# Patient Record
Sex: MALE | Race: WHITE | NOT HISPANIC OR LATINO | Employment: FULL TIME | URBAN - METROPOLITAN AREA
[De-identification: names, ages, dates, MRNs, and addresses within clinical notes are randomized per-mention and may not be internally consistent; named-entity substitution may affect disease eponyms.]

---

## 2017-01-10 ENCOUNTER — AMBULATORY - HEALTHEAST (OUTPATIENT)
Dept: INTERNAL MEDICINE | Facility: CLINIC | Age: 52
End: 2017-01-10

## 2017-01-10 DIAGNOSIS — I25.10 CAD IN NATIVE ARTERY: ICD-10-CM

## 2017-01-17 ENCOUNTER — RECORDS - HEALTHEAST (OUTPATIENT)
Dept: ADMINISTRATIVE | Facility: OTHER | Age: 52
End: 2017-01-17

## 2017-02-07 ENCOUNTER — RECORDS - HEALTHEAST (OUTPATIENT)
Dept: ADMINISTRATIVE | Facility: OTHER | Age: 52
End: 2017-02-07

## 2017-10-14 ENCOUNTER — COMMUNICATION - HEALTHEAST (OUTPATIENT)
Dept: INTERNAL MEDICINE | Facility: CLINIC | Age: 52
End: 2017-10-14

## 2017-10-14 DIAGNOSIS — M10.9 GOUT: ICD-10-CM

## 2017-11-29 ENCOUNTER — AMBULATORY - HEALTHEAST (OUTPATIENT)
Dept: INTERNAL MEDICINE | Facility: CLINIC | Age: 52
End: 2017-11-29

## 2017-12-21 ENCOUNTER — COMMUNICATION - HEALTHEAST (OUTPATIENT)
Dept: INTERNAL MEDICINE | Facility: CLINIC | Age: 52
End: 2017-12-21

## 2017-12-22 ENCOUNTER — OFFICE VISIT - HEALTHEAST (OUTPATIENT)
Dept: INTERNAL MEDICINE | Facility: CLINIC | Age: 52
End: 2017-12-22

## 2017-12-22 DIAGNOSIS — I25.10 CORONARY ARTERY DISEASE INVOLVING NATIVE HEART WITHOUT ANGINA PECTORIS, UNSPECIFIED VESSEL OR LESION TYPE: ICD-10-CM

## 2017-12-22 DIAGNOSIS — K80.00 CALCULUS OF GALLBLADDER WITH ACUTE CHOLECYSTITIS WITHOUT OBSTRUCTION: ICD-10-CM

## 2017-12-22 DIAGNOSIS — Z00.00 ROUTINE GENERAL MEDICAL EXAMINATION AT A HEALTH CARE FACILITY: ICD-10-CM

## 2017-12-22 DIAGNOSIS — M10.9 GOUT: ICD-10-CM

## 2017-12-22 DIAGNOSIS — Z82.49 FAMILY HISTORY OF CORONARY ARTERIOSCLEROSIS: ICD-10-CM

## 2017-12-22 DIAGNOSIS — E78.00 PURE HYPERCHOLESTEROLEMIA: ICD-10-CM

## 2017-12-22 LAB
CHOLEST SERPL-MCNC: 148 MG/DL
FASTING STATUS PATIENT QL REPORTED: YES
HDLC SERPL-MCNC: 58 MG/DL
LDLC SERPL CALC-MCNC: 47 MG/DL
PSA SERPL-MCNC: 1 NG/ML (ref 0–3.5)
TRIGL SERPL-MCNC: 216 MG/DL

## 2017-12-22 ASSESSMENT — MIFFLIN-ST. JEOR: SCORE: 2001.99

## 2017-12-24 ENCOUNTER — COMMUNICATION - HEALTHEAST (OUTPATIENT)
Dept: INTERNAL MEDICINE | Facility: CLINIC | Age: 52
End: 2017-12-24

## 2018-01-16 ENCOUNTER — COMMUNICATION - HEALTHEAST (OUTPATIENT)
Dept: INTERNAL MEDICINE | Facility: CLINIC | Age: 53
End: 2018-01-16

## 2018-01-16 DIAGNOSIS — M10.9 GOUT: ICD-10-CM

## 2018-01-17 ENCOUNTER — COMMUNICATION - HEALTHEAST (OUTPATIENT)
Dept: INTERNAL MEDICINE | Facility: CLINIC | Age: 53
End: 2018-01-17

## 2018-04-22 ENCOUNTER — COMMUNICATION - HEALTHEAST (OUTPATIENT)
Dept: INTERNAL MEDICINE | Facility: CLINIC | Age: 53
End: 2018-04-22

## 2018-04-22 DIAGNOSIS — E78.00 PURE HYPERCHOLESTEROLEMIA: ICD-10-CM

## 2018-12-30 ENCOUNTER — COMMUNICATION - HEALTHEAST (OUTPATIENT)
Dept: INTERNAL MEDICINE | Facility: CLINIC | Age: 53
End: 2018-12-30

## 2018-12-30 DIAGNOSIS — E78.00 PURE HYPERCHOLESTEROLEMIA: ICD-10-CM

## 2018-12-30 DIAGNOSIS — M10.9 GOUT: ICD-10-CM

## 2019-08-27 ENCOUNTER — AMBULATORY - HEALTHEAST (OUTPATIENT)
Dept: INTERNAL MEDICINE | Facility: CLINIC | Age: 54
End: 2019-08-27

## 2019-08-27 DIAGNOSIS — M54.40 ACUTE RIGHT-SIDED LOW BACK PAIN WITH SCIATICA, SCIATICA LATERALITY UNSPECIFIED: ICD-10-CM

## 2019-10-29 ENCOUNTER — RECORDS - HEALTHEAST (OUTPATIENT)
Dept: GENERAL RADIOLOGY | Facility: CLINIC | Age: 54
End: 2019-10-29

## 2019-10-29 ENCOUNTER — OFFICE VISIT - HEALTHEAST (OUTPATIENT)
Dept: INTERNAL MEDICINE | Facility: CLINIC | Age: 54
End: 2019-10-29

## 2019-10-29 DIAGNOSIS — Z00.00 ROUTINE GENERAL MEDICAL EXAMINATION AT A HEALTH CARE FACILITY: ICD-10-CM

## 2019-10-29 DIAGNOSIS — E78.00 PURE HYPERCHOLESTEROLEMIA: ICD-10-CM

## 2019-10-29 DIAGNOSIS — E66.9 OBESITY, UNSPECIFIED CLASSIFICATION, UNSPECIFIED OBESITY TYPE, UNSPECIFIED WHETHER SERIOUS COMORBIDITY PRESENT: ICD-10-CM

## 2019-10-29 DIAGNOSIS — R74.02 NONSPECIFIC ELEVATION OF LEVELS OF TRANSAMINASE OR LACTIC ACID DEHYDROGENASE (LDH): ICD-10-CM

## 2019-10-29 DIAGNOSIS — M51.16 LUMBAR DISC DISEASE WITH RADICULOPATHY: ICD-10-CM

## 2019-10-29 DIAGNOSIS — G47.33 OSA ON CPAP: ICD-10-CM

## 2019-10-29 DIAGNOSIS — I25.10 CORONARY ARTERY DISEASE INVOLVING NATIVE HEART WITHOUT ANGINA PECTORIS, UNSPECIFIED VESSEL OR LESION TYPE: ICD-10-CM

## 2019-10-29 DIAGNOSIS — M79.644 PAIN IN FINGER OF BOTH HANDS: ICD-10-CM

## 2019-10-29 DIAGNOSIS — M79.645 PAIN IN FINGER OF BOTH HANDS: ICD-10-CM

## 2019-10-29 DIAGNOSIS — Z12.5 SCREENING FOR PROSTATE CANCER: ICD-10-CM

## 2019-10-29 DIAGNOSIS — M10.9 GOUT: ICD-10-CM

## 2019-10-29 DIAGNOSIS — Z23 IMMUNIZATION DUE: ICD-10-CM

## 2019-10-29 DIAGNOSIS — M79.645 PAIN IN LEFT FINGER(S): ICD-10-CM

## 2019-10-29 DIAGNOSIS — Z11.59 NEED FOR HEPATITIS C SCREENING TEST: ICD-10-CM

## 2019-10-29 DIAGNOSIS — N52.9 ERECTILE DYSFUNCTION, UNSPECIFIED ERECTILE DYSFUNCTION TYPE: ICD-10-CM

## 2019-10-29 DIAGNOSIS — R74.01 NONSPECIFIC ELEVATION OF LEVELS OF TRANSAMINASE OR LACTIC ACID DEHYDROGENASE (LDH): ICD-10-CM

## 2019-10-29 DIAGNOSIS — R53.83 FATIGUE, UNSPECIFIED TYPE: ICD-10-CM

## 2019-10-29 DIAGNOSIS — Z23 NEED FOR PROPHYLACTIC VACCINATION AND INOCULATION AGAINST INFLUENZA: ICD-10-CM

## 2019-10-29 DIAGNOSIS — M79.644 PAIN IN RIGHT FINGER(S): ICD-10-CM

## 2019-10-29 DIAGNOSIS — Z13.1 SCREENING FOR DIABETES MELLITUS: ICD-10-CM

## 2019-10-29 LAB
ALBUMIN SERPL-MCNC: 4.7 G/DL (ref 3.5–5)
ALBUMIN UR-MCNC: ABNORMAL MG/DL
ALP SERPL-CCNC: 65 U/L (ref 45–120)
ALT SERPL W P-5'-P-CCNC: 62 U/L (ref 0–45)
ANION GAP SERPL CALCULATED.3IONS-SCNC: 11 MMOL/L (ref 5–18)
APPEARANCE UR: CLEAR
AST SERPL W P-5'-P-CCNC: 45 U/L (ref 0–40)
BACTERIA #/AREA URNS HPF: ABNORMAL HPF
BILIRUB SERPL-MCNC: 0.8 MG/DL (ref 0–1)
BILIRUB UR QL STRIP: NEGATIVE
BUN SERPL-MCNC: 16 MG/DL (ref 8–22)
C REACTIVE PROTEIN LHE: 0.2 MG/DL (ref 0–0.8)
CALCIUM SERPL-MCNC: 10 MG/DL (ref 8.5–10.5)
CCP AB SER IA-ACNC: 1.9 U/ML
CHLORIDE BLD-SCNC: 104 MMOL/L (ref 98–107)
CHOLEST SERPL-MCNC: 156 MG/DL
CO2 SERPL-SCNC: 25 MMOL/L (ref 22–31)
COLOR UR AUTO: YELLOW
CREAT SERPL-MCNC: 0.87 MG/DL (ref 0.7–1.3)
ERYTHROCYTE [DISTWIDTH] IN BLOOD BY AUTOMATED COUNT: 11.5 % (ref 11–14.5)
ERYTHROCYTE [SEDIMENTATION RATE] IN BLOOD BY WESTERGREN METHOD: 4 MM/HR (ref 0–15)
FASTING STATUS PATIENT QL REPORTED: YES
GFR SERPL CREATININE-BSD FRML MDRD: >60 ML/MIN/1.73M2
GLUCOSE BLD-MCNC: 120 MG/DL (ref 70–125)
GLUCOSE UR STRIP-MCNC: NEGATIVE MG/DL
HBA1C MFR BLD: 6.1 % (ref 3.5–6)
HCT VFR BLD AUTO: 46.4 % (ref 40–54)
HDLC SERPL-MCNC: 47 MG/DL
HGB BLD-MCNC: 15.8 G/DL (ref 14–18)
HGB UR QL STRIP: NEGATIVE
HYALINE CASTS #/AREA URNS LPF: ABNORMAL LPF
KETONES UR STRIP-MCNC: NEGATIVE MG/DL
LDLC SERPL CALC-MCNC: 56 MG/DL
LEUKOCYTE ESTERASE UR QL STRIP: NEGATIVE
MCH RBC QN AUTO: 30.3 PG (ref 27–34)
MCHC RBC AUTO-ENTMCNC: 34.1 G/DL (ref 32–36)
MCV RBC AUTO: 89 FL (ref 80–100)
MUCOUS THREADS #/AREA URNS LPF: ABNORMAL LPF
NITRATE UR QL: NEGATIVE
PH UR STRIP: 5.5 [PH] (ref 4.5–8)
PLATELET # BLD AUTO: 202 THOU/UL (ref 140–440)
PMV BLD AUTO: 8.6 FL (ref 7–10)
POTASSIUM BLD-SCNC: 4.4 MMOL/L (ref 3.5–5)
PROT SERPL-MCNC: 7.5 G/DL (ref 6–8)
PSA SERPL-MCNC: 1 NG/ML (ref 0–3.5)
RBC # BLD AUTO: 5.23 MILL/UL (ref 4.4–6.2)
RBC #/AREA URNS AUTO: ABNORMAL HPF
RHEUMATOID FACT SERPL-ACNC: 16.9 IU/ML (ref 0–30)
SODIUM SERPL-SCNC: 140 MMOL/L (ref 136–145)
SP GR UR STRIP: 1.02 (ref 1–1.03)
SQUAMOUS #/AREA URNS AUTO: ABNORMAL LPF
TRIGL SERPL-MCNC: 267 MG/DL
TSH SERPL DL<=0.005 MIU/L-ACNC: 2.04 UIU/ML (ref 0.3–5)
UROBILINOGEN UR STRIP-ACNC: ABNORMAL
WBC #/AREA URNS AUTO: ABNORMAL HPF
WBC: 5.6 THOU/UL (ref 4–11)

## 2019-10-29 RX ORDER — QUINIDINE SULFATE 200 MG
50 TABLET ORAL 2 TIMES DAILY
Status: SHIPPED | COMMUNITY
Start: 2019-10-29 | End: 2023-12-27

## 2019-10-29 RX ORDER — CHLORAL HYDRATE 500 MG
2 CAPSULE ORAL EVERY MORNING
Status: SHIPPED | COMMUNITY
Start: 2019-10-29

## 2019-10-29 ASSESSMENT — MIFFLIN-ST. JEOR: SCORE: 2029.2

## 2019-10-30 LAB — HCV AB SERPL QL IA: NEGATIVE

## 2019-10-31 LAB — ANA SER QL: 0.4 U

## 2020-02-13 ENCOUNTER — AMBULATORY - HEALTHEAST (OUTPATIENT)
Dept: NURSING | Facility: CLINIC | Age: 55
End: 2020-02-13

## 2020-02-13 DIAGNOSIS — Z23 NEED FOR ZOSTER VACCINATION: ICD-10-CM

## 2020-05-13 ENCOUNTER — RECORDS - HEALTHEAST (OUTPATIENT)
Dept: ADMINISTRATIVE | Facility: OTHER | Age: 55
End: 2020-05-13

## 2020-06-19 ENCOUNTER — RECORDS - HEALTHEAST (OUTPATIENT)
Dept: ADMINISTRATIVE | Facility: OTHER | Age: 55
End: 2020-06-19

## 2020-06-24 ENCOUNTER — RECORDS - HEALTHEAST (OUTPATIENT)
Dept: ADMINISTRATIVE | Facility: OTHER | Age: 55
End: 2020-06-24

## 2020-11-17 ENCOUNTER — COMMUNICATION - HEALTHEAST (OUTPATIENT)
Dept: INTERNAL MEDICINE | Facility: CLINIC | Age: 55
End: 2020-11-17

## 2020-11-17 DIAGNOSIS — M10.9 GOUT: ICD-10-CM

## 2020-11-17 DIAGNOSIS — E78.00 PURE HYPERCHOLESTEROLEMIA: ICD-10-CM

## 2020-11-18 RX ORDER — ALLOPURINOL 100 MG/1
100 TABLET ORAL DAILY
Qty: 90 TABLET | Refills: 3 | Status: SHIPPED | OUTPATIENT
Start: 2020-11-18 | End: 2021-12-17

## 2020-11-18 RX ORDER — ROSUVASTATIN CALCIUM 20 MG/1
20 TABLET, COATED ORAL DAILY
Qty: 90 TABLET | Refills: 3 | Status: SHIPPED | OUTPATIENT
Start: 2020-11-18 | End: 2021-12-17

## 2021-04-05 ENCOUNTER — RECORDS - HEALTHEAST (OUTPATIENT)
Dept: GENERAL RADIOLOGY | Facility: CLINIC | Age: 56
End: 2021-04-05

## 2021-04-05 ENCOUNTER — OFFICE VISIT - HEALTHEAST (OUTPATIENT)
Dept: INTERNAL MEDICINE | Facility: CLINIC | Age: 56
End: 2021-04-05

## 2021-04-05 DIAGNOSIS — R05.2 SUBACUTE COUGH: ICD-10-CM

## 2021-04-05 DIAGNOSIS — M54.2 CERVICALGIA: ICD-10-CM

## 2021-04-05 DIAGNOSIS — Z12.5 SCREENING FOR PROSTATE CANCER: ICD-10-CM

## 2021-04-05 DIAGNOSIS — Z13.1 SCREENING FOR DIABETES MELLITUS: ICD-10-CM

## 2021-04-05 DIAGNOSIS — M10.9 GOUT, UNSPECIFIED CAUSE, UNSPECIFIED CHRONICITY, UNSPECIFIED SITE: ICD-10-CM

## 2021-04-05 DIAGNOSIS — N52.9 ERECTILE DYSFUNCTION, UNSPECIFIED ERECTILE DYSFUNCTION TYPE: ICD-10-CM

## 2021-04-05 DIAGNOSIS — Z00.00 ROUTINE GENERAL MEDICAL EXAMINATION AT A HEALTH CARE FACILITY: ICD-10-CM

## 2021-04-05 DIAGNOSIS — M54.2 NECK PAIN: ICD-10-CM

## 2021-04-05 DIAGNOSIS — R74.01 NONSPECIFIC ELEVATION OF LEVELS OF TRANSAMINASE OR LACTIC ACID DEHYDROGENASE (LDH): ICD-10-CM

## 2021-04-05 DIAGNOSIS — E78.00 PURE HYPERCHOLESTEROLEMIA: ICD-10-CM

## 2021-04-05 DIAGNOSIS — R74.02 NONSPECIFIC ELEVATION OF LEVELS OF TRANSAMINASE OR LACTIC ACID DEHYDROGENASE (LDH): ICD-10-CM

## 2021-04-05 DIAGNOSIS — G47.33 OSA ON CPAP: ICD-10-CM

## 2021-04-05 DIAGNOSIS — I25.10 CORONARY ARTERY DISEASE INVOLVING NATIVE HEART WITHOUT ANGINA PECTORIS, UNSPECIFIED VESSEL OR LESION TYPE: ICD-10-CM

## 2021-04-05 LAB
ALBUMIN SERPL-MCNC: 4.3 G/DL (ref 3.5–5)
ALP SERPL-CCNC: 63 U/L (ref 45–120)
ALT SERPL W P-5'-P-CCNC: 46 U/L (ref 0–45)
ANION GAP SERPL CALCULATED.3IONS-SCNC: 10 MMOL/L (ref 5–18)
AST SERPL W P-5'-P-CCNC: 34 U/L (ref 0–40)
BILIRUB SERPL-MCNC: 0.7 MG/DL (ref 0–1)
BUN SERPL-MCNC: 15 MG/DL (ref 8–22)
CALCIUM SERPL-MCNC: 9.3 MG/DL (ref 8.5–10.5)
CHLORIDE BLD-SCNC: 105 MMOL/L (ref 98–107)
CHOLEST SERPL-MCNC: 139 MG/DL
CO2 SERPL-SCNC: 24 MMOL/L (ref 22–31)
CREAT SERPL-MCNC: 0.78 MG/DL (ref 0.7–1.3)
ERYTHROCYTE [DISTWIDTH] IN BLOOD BY AUTOMATED COUNT: 12 % (ref 11–14.5)
FASTING STATUS PATIENT QL REPORTED: YES
GFR SERPL CREATININE-BSD FRML MDRD: >60 ML/MIN/1.73M2
GLUCOSE BLD-MCNC: 111 MG/DL (ref 70–125)
HBA1C MFR BLD: 5.8 %
HCT VFR BLD AUTO: 45.8 % (ref 40–54)
HDLC SERPL-MCNC: 47 MG/DL
HGB BLD-MCNC: 15.2 G/DL (ref 14–18)
LDLC SERPL CALC-MCNC: 48 MG/DL
MCH RBC QN AUTO: 29.6 PG (ref 27–34)
MCHC RBC AUTO-ENTMCNC: 33.2 G/DL (ref 32–36)
MCV RBC AUTO: 89 FL (ref 80–100)
PLATELET # BLD AUTO: 195 THOU/UL (ref 140–440)
PMV BLD AUTO: 9.5 FL (ref 7–10)
POTASSIUM BLD-SCNC: 4 MMOL/L (ref 3.5–5)
PROT SERPL-MCNC: 7.3 G/DL (ref 6–8)
PSA SERPL-MCNC: 1 NG/ML (ref 0–3.5)
RBC # BLD AUTO: 5.13 MILL/UL (ref 4.4–6.2)
SODIUM SERPL-SCNC: 139 MMOL/L (ref 136–145)
TRIGL SERPL-MCNC: 221 MG/DL
TSH SERPL DL<=0.005 MIU/L-ACNC: 1.88 UIU/ML (ref 0.3–5)
WBC: 4.4 THOU/UL (ref 4–11)

## 2021-04-05 ASSESSMENT — MIFFLIN-ST. JEOR: SCORE: 2038.56

## 2021-04-07 ENCOUNTER — IMMUNIZATION (OUTPATIENT)
Dept: NURSING | Facility: CLINIC | Age: 56
End: 2021-04-07
Payer: COMMERCIAL

## 2021-04-07 LAB — TESTOST SERPL-MCNC: 392 NG/DL (ref 221–716)

## 2021-04-07 PROCEDURE — 0001A PR COVID VAC PFIZER DIL RECON 30 MCG/0.3 ML IM: CPT

## 2021-04-07 PROCEDURE — 91300 PR COVID VAC PFIZER DIL RECON 30 MCG/0.3 ML IM: CPT

## 2021-04-28 ENCOUNTER — IMMUNIZATION (OUTPATIENT)
Dept: NURSING | Facility: CLINIC | Age: 56
End: 2021-04-28
Attending: INTERNAL MEDICINE
Payer: COMMERCIAL

## 2021-04-28 PROCEDURE — 91300 PR COVID VAC PFIZER DIL RECON 30 MCG/0.3 ML IM: CPT

## 2021-04-28 PROCEDURE — 0002A PR COVID VAC PFIZER DIL RECON 30 MCG/0.3 ML IM: CPT

## 2021-05-31 VITALS — WEIGHT: 236 LBS | HEIGHT: 76 IN | BODY MASS INDEX: 28.74 KG/M2

## 2021-06-02 NOTE — PROGRESS NOTES
Office Visit - Physical   Jared Rae   54 y.o.  male    Date of visit: 10/29/2019  Physician: Jacoby Jaquez MD     Assessment and Plan   1. Routine general medical examination at a health care facility  This is a generally healthy 54-year-old man with issues as discussed below.  Ongoing healthy lifestyle discussed and recommended.    2. Need for prophylactic vaccination and inoculation against influenza  - Influenza, Recombinant, Inj, Quadrivalent, PF, 18+YRS    3. Screening for prostate cancer  - PSA (Prostatic-Specific Antigen), Annual Screen    4. Screening for diabetes mellitus  - Glycosylated Hemoglobin A1c    5. Need for hepatitis C screening test  - Hepatitis C Antibody (Anti-HCV)    6. Pure hypercholesterolemia  - rosuvastatin (CRESTOR) 20 MG tablet; Take 1 tablet (20 mg total) by mouth daily.  Dispense: 90 tablet; Refill: 3  - Lipid Cascade    7. Gout  - allopurinol (ZYLOPRIM) 100 MG tablet; Take 1 tablet (100 mg total) by mouth daily.  Dispense: 90 tablet; Refill: 3    8. Coronary artery disease involving native heart without angina pectoris, unspecified vessel or lesion type  Continue primary prevention, no history of coronary event or intervention  - HM2(CBC w/o Differential)  - Comprehensive Metabolic Panel  - Urinalysis-UC if Indicated    9. SAM on CPAP    10. Lumbar disc disease with radiculopathy  Continue with home exercise, discussed importance of weight loss.    11. Erectile dysfunction, unspecified erectile dysfunction type  Has not been an issue recently    12. Serum Enzyme Levels - ALT (SGPT) Elevated  Likely related to alcohol and her weight, recheck if still elevated will discuss evaluation of other causes as well  - Comprehensive Metabolic Panel    13. Pain in finger of both hands  Likely osteoarthritic, no synovial thickening or inflammation on exam given sister's history we will check labs and imaging studies below  - Antinuclear Antibody (OG) Cascade  - Rheumatoid Factor  Quant  - CCP Antibodies  - XR Hands Bilateral PA VW; Future  - Erythrocyte Sedimentation Rate  - C-Reactive Protein    14. Fatigue, unspecified type  - Thyroid Cascade    15. Immunization due  - Varicella Zoster, Recombinant Vaccine IM    16. Obesity, unspecified classification, unspecified obesity type, unspecified whether serious comorbidity present  The following high BMI interventions were performed this visit: encouragement to exercise and lifestyle education regarding diet    Return in about 6 months (around 4/29/2020) for recheck.     Chief Complaint   Chief Complaint   Patient presents with     Annual Exam     fasting     Neck Pain     headache     Fatigue     Arthritis     hands        Patient Profile   Social History     Patient does not qualify to have social determinant information on file (likely too young).   Social History Narrative    , wife Lakeshia.  Three daughters, Lauryn, Teresa, Audrey.  Owns [employer].          Past Medical History   Patient Active Problem List   Diagnosis     Pure hypercholesterolemia     Serum Enzyme Levels - ALT (SGPT) Elevated     Gout     ED (erectile dysfunction)     Lumbar disc disease with radiculopathy     SAM on CPAP     Coronary artery disease involving native heart without angina pectoris, unspecified vessel or lesion type       Past Surgical History  He has a past surgical history that includes Laparoscopic cholecystectomy (N/A, 11/1/2016); Anterior cruciate ligament repair (Bilateral, he was 24 yrs and 46 years old); and Inguinal hernia repair.     History of Present Illness   This 54 y.o. old man comes in to establish care and for annual physical and evaluation of acute medical issues.  His medical history is reviewed, electronic medical records update is reflectance no.  His sister was recently diagnosed with lupus.  He has some pain in his third and fourth fingers which wakes him up at night.  He has stiffness that lasts for multiple  hours during the day.  Never red hot or swollen.  He has no other joint issues.  Some knee pain neck and low back pain which are more chronic and related to injuries.  No known kidney disease.  No history of pleuritic pain or pleurisy.  No history of inflammatory eye conditions.  He does have gout on allopurinol and doing well.  He has a history of coronary artery disease without history of myocardial infarction or intervention.  He is on rosuvastatin aspirin tolerating.  He is gained some weight he would like to lose some weight.  He has some back pain recently had an injection which was helpful.  Is also been to physician neck and back in the past.  Having some neck pain which caused some posterior headache.    Review of Systems: A comprehensive review of systems was negative except as noted.     Medications and Allergies   Current Outpatient Medications   Medication Sig Dispense Refill     allopurinol (ZYLOPRIM) 100 MG tablet Take 1 tablet (100 mg total) by mouth daily. 90 tablet 3     aspirin 81 MG EC tablet Take 1 tablet (81 mg total) by mouth daily. 90 tablet 3     co-enzyme Q-10 30 mg capsule Take 30 mg by mouth 3 (three) times a day.       multivit-min/ferrous fumarate (MULTI VITAMIN ORAL) Take by mouth.       mv-mn/iron/folic acid/herb 190 (VITAMIN D3 COMPLETE ORAL) Take 50 mcg by mouth 2 (two) times a day.       omega-3/dha/epa/fish oil (FISH OIL-OMEGA-3 FATTY ACIDS) 300-1,000 mg capsule Take 2 g by mouth daily.       rosuvastatin (CRESTOR) 20 MG tablet Take 1 tablet (20 mg total) by mouth daily. 90 tablet 3     No current facility-administered medications for this visit.      No Known Allergies     Family and Social History   Family History   Problem Relation Age of Onset     Heart attack Father      Coronary artery disease Brother      No Medical Problems Mother      Congenital heart disease Sister      Lupus Sister      Coronary artery disease Brother      Coronary artery disease Brother      CABG  "Brother      No Medical Problems Brother      No Medical Problems Brother      No Medical Problems Daughter      No Medical Problems Daughter      No Medical Problems Daughter         Social History     Tobacco Use     Smoking status: Never Smoker     Smokeless tobacco: Never Used   Substance Use Topics     Alcohol use: Yes     Alcohol/week: 10.0 standard drinks     Types: 10 Standard drinks or equivalent per week     Drug use: No        Physical Exam   General Appearance:   No acute distress    /84 (Patient Site: Right Arm, Patient Position: Sitting, Cuff Size: Adult Regular)   Pulse 68   Ht 6' 4\" (1.93 m)   Wt (!) 242 lb (109.8 kg)   SpO2 98%   BMI 29.46 kg/m      EYES: Eyelids, conjunctiva, and sclera were normal. Pupils were normal. Cornea, iris, and lens were normal bilaterally.  HEAD, EARS, NOSE, MOUTH, AND THROAT: Head and face were normal. Hearing was normal to voice and the ears were normal to external exam. Nose appearance was normal and there was no discharge. Oropharynx was normal.  NECK: Neck appearance was normal. There were no neck masses and the thyroid was not enlarged.  RESPIRATORY: Breathing pattern was normal and the chest moved symmetrically.  Percussion/auscultatory percussion was normal.  Lung sounds were normal and there were no abnormal sounds.  CARDIOVASCULAR: Heart rate and rhythm were normal.  S1 and S2 were normal and there were no extra sounds or murmurs. Peripheral pulses in arms and legs were normal.  Jugular venous pressure was normal.  There was no peripheral edema.  GASTROINTESTINAL: The abdomen was normal in contour.  Bowel sounds were present.  Percussion detected no organ enlargement or tenderness.  Palpation detected no tenderness, mass, or enlarged organs.   MUSCULOSKELETAL: Skeletal configuration was normal and muscle mass was normal for age. Joint appearance was overall normal.  LYMPHATIC: There were no enlarged nodes.  SKIN/HAIR/NAILS: Skin color was normal.  " There were no skin lesions.  Hair and nails were normal.  NEUROLOGIC: The patient was alert and oriented to person, place, time, and circumstance. Speech was normal. Cranial nerves were normal. Motor strength was normal for age. The patient was normally coordinated.  PSYCHIATRIC:  Mood and affect were normal and the patient had normal recent and remote memory. The patient's judgment and insight were normal.       Additional Information        Jacoby Jaquez MD  Internal Medicine  Contact me at [phone]

## 2021-06-03 VITALS
DIASTOLIC BLOOD PRESSURE: 84 MMHG | HEIGHT: 76 IN | WEIGHT: 242 LBS | OXYGEN SATURATION: 98 % | SYSTOLIC BLOOD PRESSURE: 136 MMHG | HEART RATE: 68 BPM | BODY MASS INDEX: 29.47 KG/M2

## 2021-06-05 VITALS
SYSTOLIC BLOOD PRESSURE: 134 MMHG | DIASTOLIC BLOOD PRESSURE: 84 MMHG | HEIGHT: 76 IN | BODY MASS INDEX: 29.72 KG/M2 | HEART RATE: 60 BPM | WEIGHT: 244.06 LBS | OXYGEN SATURATION: 96 %

## 2021-06-08 NOTE — PROGRESS NOTES
I did review Camden's CTA with him.  There was evidence of diffuse mildly obstructive coronary disease with possible significant stenosis at the second diagonal with luminal stenosis of up to 70%.  There was significant calcification with mild-to-moderate nonocclusive calcific plaque at the first diagonal and diffuse proximal calcific plaque in the proximal LAD which was non-obstructive    Given these findings it is my impression the knee has asymptomatic coronary disease that does not currently require intervention.  I would recommend aggressive management with antiplatelet therapy i.e. aspirin 81 mg daily and statin therapy with a target LDL of less than 50.  We'll begin Crestor 20 mg daily with follow-up in 3 months    Also discussed obtaining an opinion from a cardiologist and will review findings with Dr. Vick Lemon and plan consultation with him as well although he appears to be in agreement that intervention not likely necessary at this time.

## 2021-06-13 NOTE — TELEPHONE ENCOUNTER
RN cannot approve Refill Request    RN can NOT refill this medication Protocol failed and NO refill given. Last office visit: Visit date not found Last Physical: 10/29/2019 Last MTM visit: Visit date not found Last visit same specialty: 11/29/2016 Ozzy Corey MD.  Next visit within 3 mo: Visit date not found  Next physical within 3 mo: Visit date not found      Kristan WETZEL [place], [facility] Connection Triage/Med Refill 11/18/2020    Requested Prescriptions   Pending Prescriptions Disp Refills     allopurinoL (ZYLOPRIM) 100 MG tablet 90 tablet 3     Sig: Take 1 tablet (100 mg total) by mouth daily.       Allopurinol/Febuxostat Refill Protocol  Failed - 11/17/2020  9:41 AM        Failed - LFT or AST or ALT in last 12 months     Albumin   Date Value Ref Range Status   10/29/2019 4.7 3.5 - 5.0 g/dL Final     Bilirubin, Total   Date Value Ref Range Status   10/29/2019 0.8 0.0 - 1.0 mg/dL Final     Alkaline Phosphatase   Date Value Ref Range Status   10/29/2019 65 45 - 120 U/L Final     AST   Date Value Ref Range Status   10/29/2019 45 (H) 0 - 40 U/L Final     ALT   Date Value Ref Range Status   10/29/2019 62 (H) 0 - 45 U/L Final     Protein, Total   Date Value Ref Range Status   10/29/2019 7.5 6.0 - 8.0 g/dL Final                Failed - Visit with PCP or prescribing provider visit in past 12 months     Last office visit with prescriber/PCP: Visit date not found OR same dept: Visit date not found OR same specialty: 11/29/2016 Ozzy Corey MD  Last physical: 10/29/2019 Last MTM visit: Visit date not found   Next visit within 3 mo: Visit date not found  Next physical within 3 mo: Visit date not found  Prescriber OR PCP: Jacoby Jaquez MD  Last diagnosis associated with med order: 1. Gout  - allopurinoL (ZYLOPRIM) 100 MG tablet; Take 1 tablet (100 mg total) by mouth daily.  Dispense: 90 tablet; Refill: 3    2. Pure hypercholesterolemia  - rosuvastatin (CRESTOR) 20 MG tablet; Take 1 tablet (20 mg total) by mouth daily.   Dispense: 90 tablet; Refill: 3    If protocol passes may refill for 12 months if within 3 months of last provider visit (or a total of 15 months).                rosuvastatin (CRESTOR) 20 MG tablet 90 tablet 3     Sig: Take 1 tablet (20 mg total) by mouth daily.       Statins Refill Protocol (Hmg CoA Reductase Inhibitors) Failed - 11/17/2020  9:41 AM        Failed - PCP or prescribing provider visit in past 12 months      Last office visit with prescriber/PCP: Visit date not found OR same dept: Visit date not found OR same specialty: 11/29/2016 Ozzy Corey MD  Last physical: 10/29/2019 Last MTM visit: Visit date not found   Next visit within 3 mo: Visit date not found  Next physical within 3 mo: Visit date not found  Prescriber OR PCP: Jacoby Jaquez MD  Last diagnosis associated with med order: 1. Gout  - allopurinoL (ZYLOPRIM) 100 MG tablet; Take 1 tablet (100 mg total) by mouth daily.  Dispense: 90 tablet; Refill: 3    2. Pure hypercholesterolemia  - rosuvastatin (CRESTOR) 20 MG tablet; Take 1 tablet (20 mg total) by mouth daily.  Dispense: 90 tablet; Refill: 3    If protocol passes may refill for 12 months if within 3 months of last provider visit (or a total of 15 months).

## 2021-06-14 NOTE — PROGRESS NOTES
"Office Visit - Physical    Jared Rae   52 y.o. male    Date of Visit: 12/22/2017    Chief Complaint   Patient presents with     Annual Exam     Pt is fasting       Subjective: Camden is here for regular follow-up history of coronary disease which is nonocclusive.  Has been followed by Dr. Vick Byrne.  Has been asymptomatic.  CTA did reveal LAD lesion nuclear treadmill was negative he is treated with high-dose rosuvastatin and aspirin    Remote history of gout stable    History of gangrenous cholecystitis and cholelithiasis underwent laparoscopic cholecystectomy by Dr. Ezio Love last year stable    History of lumbar disc disease with radiculopathy stable    No recent episodes of gout.    Does have complaints of atypical symptoms of arthralgias or discomfort in the hands primarily PIPs and DIPs although symptoms are not typical of early arthritis.  At times has a sensation of trigger finger however this involves multiple digits.  I find no abnormalities on exam but would refer to Dr. home or 1 of the other hand surgeons for evaluation and consideration of therapy    Ethanol intake moderate he is a non-smoker he exercises regularly    Family history well known and reviewed    No other complaints    ROS: A comprehensive review of systems was performed and was otherwise negative except as mentioned above.    Exam   Normal mental status skin negative EENT normal head and neck unremarkable hands normal exam no abnormalities heart normal lungs clear abdomen negative genitalia negative rectal unremarkable extremities normal with normal pulses  /82  Pulse 80  Ht 6' 4\" (1.93 m)  Wt (!) 236 lb (107 kg)  BMI 28.73 kg/m2    Assessment and Plan    Normal follow-up exam stable no changes plan routine follow-up annually labs will be reviewed    Jared was seen today for annual exam.    Diagnoses and all orders for this visit:    Routine general medical examination at a health care facility  -     [facility](CBC w/o " Differential); Future  -     Comprehensive Metabolic Panel; Future  -     Lipid Cascade; Future  -     PSA (Prostatic-Specific Antigen), Annual Screen    Coronary artery disease involving native heart without angina pectoris, unspecified vessel or lesion type  -     HM2(CBC w/o Differential); Future  -     Comprehensive Metabolic Panel; Future  -     Lipid Cascade; Future    Calculus of gallbladder with acute cholecystitis without obstruction  -     HM2(CBC w/o Differential); Future  -     Comprehensive Metabolic Panel; Future  -     Lipid Steele; Future    Family history of coronary arteriosclerosis  -     HM2(CBC w/o Differential); Future  -     Comprehensive Metabolic Panel; Future  -     Lipid Cascade; Future    Gout  -     HM2(CBC w/o Differential); Future  -     Comprehensive Metabolic Panel; Future  -     Lipid Cascade; Future    Pure hypercholesterolemia  -     HM2(CBC w/o Differential); Future  -     Comprehensive Metabolic Panel; Future  -     Lipid Cascade; Future              Medications:   Prior to Admission medications    Medication Sig Start Date End Date Taking? Authorizing Provider   allopurinol (ZYLOPRIM) 100 MG tablet TAKE 1 TABLET BY MOUTH DAILY 10/15/17  Yes Ozzy Corey MD   aspirin 81 MG EC tablet Take 1 tablet (81 mg total) by mouth daily. 1/10/17  Yes Ozzy Corey MD   cholecalciferol, vitamin D3, 1,000 unit tablet Take by mouth daily.   Yes PROVIDER, HISTORICAL   rosuvastatin (CRESTOR) 20 MG tablet Take 1 tablet (20 mg total) by mouth daily. 1/10/17  Yes Ozzy Corey MD   b complex vitamins tablet Take 1 tablet by mouth daily.    PROVIDER, HISTORICAL   sildenafil (VIAGRA) 50 MG tablet Take 1 tablet (50 mg total) by mouth daily as needed for erectile dysfunction. 11/29/16   Ozzy Corey MD   allopurinol (ZYLOPRIM) 100 MG tablet Take 100 mg by mouth daily.  12/22/17  PROVIDER, HISTORICAL       Allergies:No Known Allergies    Immunizations:   Immunization History   Administered  Date(s) Administered     DT (pediatric) 01/01/2000     Influenza, inj, historic,unspecified 09/25/2009     Td,adult,historic,unspecified 01/01/2000       Past Medical History:   Past Medical History:   Diagnosis Date     Arthritis 2012    Gout     CAD (coronary artery disease) 12/16/2016     Family history of myocardial infarction      High cholesterol      Hypertension        Past Surgical History:   Past Surgical History:   Procedure Laterality Date     HERNIA REPAIR      he was 4 years old     JOINT REPLACEMENT  he was 24 yrs and 46 years old    ACL bilateral knee ligiments     LAPAROSCOPIC CHOLECYSTECTOMY N/A 11/1/2016    Procedure: CHOLECYSTECTOMY LAPAROSCOPIC ;  Surgeon: Ezio Love MD;  [facility]: [facility];  Service:        Family History:   Family History   Problem Relation Age of Onset     Heart attack Father      Coronary artery disease Brother        Social History:   Social History     Social History     Marital status: Single     Spouse name: Lakeshia     Number of children: 2     Years of education: N/A     Occupational History     Not on file.     Social History Main Topics     Smoking status: Never Smoker     Smokeless tobacco: Never Used     Alcohol use 9.0 oz/week     10 Cans of beer, 5 Standard drinks or equivalent per week     Drug use: No     Sexual activity: Yes     Partners: Female      Comment: takes viagra     Other Topics Concern     Not on file     Social History Narrative         Ozzy Corey MD

## 2021-06-16 NOTE — PROGRESS NOTES
Office Visit - Physical   Jared Rae   55 y.o.  male    Date of visit: 4/5/2021  Physician: Jacoby Jaquez MD     Assessment and Plan   1. Routine general medical examination at a health care facility  This is a 55-year-old man with issues as discussed below.  Ongoing healthy lifestyle discussed and recommended    2. Screening for prostate cancer  - PSA (Prostatic-Specific Antigen), Annual Screen    3. Screening for diabetes mellitus  - Glycosylated Hemoglobin A1c    4. Subacute cough  We discussed that given his sinus symptoms this could be related to some postnasal drip chronic sinusitis or rhinosinusitis.  I recommended Flonase.  We also discussed reflux especially after he had a couple of alcoholic drinks in the evening.  I recommended omeprazole.  I like him this take these both for 1 month and then if his cough improves to stop one of them and see how he does  - omeprazole (PRILOSEC) 20 MG capsule; Take 1 capsule (20 mg total) by mouth daily before breakfast.  Dispense: 30 capsule; Refill: 3  - fluticasone propionate (FLONASE) 50 mcg/actuation nasal spray; 1 spray into each nostril daily.  Dispense: 16 g; Refill: 11    5. Neck pain  - XR Cervical Spine 2 - 3 VWS; Future  - Ambulatory referral to Adult PT- Internal    6. Serum Enzyme Levels - ALT (SGPT) Elevated  We discussed relationship between fatty liver and carbohydrates, weight and alcohol.  Discussed reducing carbohydrates, regular exercise and modest weight loss.  We also discussed limiting alcohol to 4 or 5 drinks in a week    7. Pure hypercholesterolemia  Continue rosuvastatin    8. SAM on CPAP    9. Gout, unspecified cause, unspecified chronicity, unspecified site  Continue allopurinol    10. Coronary artery disease involving native heart without angina pectoris, unspecified vessel or lesion type  Continue current plan  - HM2(CBC w/o Differential)  - Lipid Cascade  - Comprehensive Metabolic Panel  - Thyroid Cascade    11. Erectile  dysfunction, unspecified erectile dysfunction type  Occasional sildenafil  - Testosterone, Total    The following high BMI interventions were performed this visit: encouragement to exercise and lifestyle education regarding diet    Return in about 1 year (around 4/5/2022) for annual physical.     Chief Complaint   Chief Complaint   Patient presents with     Annual Exam     fasting        Patient Profile   Social History     Social History Narrative    , wife Lakeshia.  Three daughters, Teresa Combs, Audrey.  Owns [employer].          Past Medical History   Patient Active Problem List   Diagnosis     Pure hypercholesterolemia     Serum Enzyme Levels - ALT (SGPT) Elevated     Gout     ED (erectile dysfunction)     Lumbar disc disease with radiculopathy     SAM on CPAP     Coronary artery disease involving native heart without angina pectoris, unspecified vessel or lesion type       Past Surgical History  He has a past surgical history that includes Laparoscopic cholecystectomy (N/A, 11/1/2016); Anterior cruciate ligament repair (Bilateral, he was 24 yrs and 46 years old); and Inguinal hernia repair.     History of Present Illness   This 55 y.o. old man comes in for annual physical.  Overall he has been doing okay.  He has had about a 6-month cough.  Constant throat clearing.  Some nasal congestion.  No symptoms of acid reflux.  He has had some neck pain which is chronic.  He has not done any physical therapy for this.  He has had success with physical therapy for his low back in the past.  He had recent elevation in his AST and ALT.  He does not drink alcohol every night but he will have 6-8 beers once or twice a week when he goes out.  He has not had any issues with this previously.  He has high triglycerides and cholesterol is on rosuvastatin.  No history of heart attack or stroke.  He is on CPAP for sleep apnea.  Gout controlled.  Some issues with erections he will occasionally take  sildenafil.    Review of Systems: A comprehensive review of systems was negative except as noted.     Medications and Allergies   Current Outpatient Medications   Medication Sig Dispense Refill     allopurinoL (ZYLOPRIM) 100 MG tablet Take 1 tablet (100 mg total) by mouth daily. 90 tablet 3     aspirin 81 MG EC tablet Take 1 tablet (81 mg total) by mouth daily. 90 tablet 3     co-enzyme Q-10 30 mg capsule Take 30 mg by mouth 3 (three) times a day.       multivit-min/ferrous fumarate (MULTI VITAMIN ORAL) Take by mouth.       mv-mn/iron/folic acid/herb 190 (VITAMIN D3 COMPLETE ORAL) Take 50 mcg by mouth 2 (two) times a day.       omega-3/dha/epa/fish oil (FISH OIL-OMEGA-3 FATTY ACIDS) 300-1,000 mg capsule Take 2 g by mouth daily.       rosuvastatin (CRESTOR) 20 MG tablet Take 1 tablet (20 mg total) by mouth daily. 90 tablet 3     fluticasone propionate (FLONASE) 50 mcg/actuation nasal spray 1 spray into each nostril daily. 16 g 11     omeprazole (PRILOSEC) 20 MG capsule Take 1 capsule (20 mg total) by mouth daily before breakfast. 30 capsule 3     No current facility-administered medications for this visit.      No Known Allergies     Family and Social History   Family History   Problem Relation Age of Onset     Heart attack Father      Coronary artery disease Brother      No Medical Problems Mother      Congenital heart disease Sister      Lupus Sister      Coronary artery disease Brother      Coronary artery disease Brother      CABG Brother      No Medical Problems Brother      No Medical Problems Brother      No Medical Problems Daughter      No Medical Problems Daughter      No Medical Problems Daughter         Social History     Tobacco Use     Smoking status: Never Smoker     Smokeless tobacco: Never Used   Substance Use Topics     Alcohol use: Yes     Alcohol/week: 10.0 standard drinks     Types: 10 Standard drinks or equivalent per week     Drug use: No        Physical Exam   General Appearance:   No acute  "distress    /84 (Patient Site: Left Arm, Patient Position: Sitting, Cuff Size: Adult Regular)   Pulse 60   Ht 6' 4\" (1.93 m)   Wt (!) 244 lb 1 oz (110.7 kg)   SpO2 96%   BMI 29.71 kg/m      EYES: Eyelids, conjunctiva, and sclera were normal. Pupils were normal. Cornea, iris, and lens were normal bilaterally.  HEAD, EARS, NOSE, MOUTH, AND THROAT: Head and face were normal. Hearing was normal to voice and the ears were normal to external exam. Nose appearance was normal and there was no discharge. Oropharynx was normal.  NECK: Neck appearance was normal. There were no neck masses and the thyroid was not enlarged.  RESPIRATORY: Breathing pattern was normal and the chest moved symmetrically.  Percussion/auscultatory percussion was normal.  Lung sounds were normal and there were no abnormal sounds.  CARDIOVASCULAR: Heart rate and rhythm were normal.  S1 and S2 were normal and there were no extra sounds or murmurs. Peripheral pulses in arms and legs were normal.  Jugular venous pressure was normal.  There was no peripheral edema.  GASTROINTESTINAL: The abdomen was normal in contour.  Bowel sounds were present.  Percussion detected no organ enlargement or tenderness.  Palpation detected no tenderness, mass, or enlarged organs.   MUSCULOSKELETAL: Skeletal configuration was normal and muscle mass was normal for age. Joint appearance was overall normal.  LYMPHATIC: There were no enlarged nodes.  SKIN/HAIR/NAILS: Skin color was normal.  There were no skin lesions.  Hair and nails were normal.  NEUROLOGIC: The patient was alert and oriented to person, place, time, and circumstance. Speech was normal. Cranial nerves were normal. Motor strength was normal for age. The patient was normally coordinated.  PSYCHIATRIC:  Mood and affect were normal and the patient had normal recent and remote memory. The patient's judgment and insight were normal.       Additional Information        Jacoby Jaquez MD  Internal " Medicine  Contact me at [phone]

## 2021-10-23 ENCOUNTER — HEALTH MAINTENANCE LETTER (OUTPATIENT)
Age: 56
End: 2021-10-23

## 2021-12-17 ENCOUNTER — NURSE TRIAGE (OUTPATIENT)
Dept: NURSING | Facility: CLINIC | Age: 56
End: 2021-12-17
Payer: COMMERCIAL

## 2021-12-17 DIAGNOSIS — M10.9 GOUT, UNSPECIFIED CAUSE, UNSPECIFIED CHRONICITY, UNSPECIFIED SITE: Primary | ICD-10-CM

## 2021-12-17 DIAGNOSIS — M10.9 GOUT: ICD-10-CM

## 2021-12-17 DIAGNOSIS — E78.00 PURE HYPERCHOLESTEROLEMIA: ICD-10-CM

## 2021-12-17 RX ORDER — ALLOPURINOL 100 MG/1
100 TABLET ORAL DAILY
Qty: 90 TABLET | Refills: 3 | Status: SHIPPED | OUTPATIENT
Start: 2021-12-17 | End: 2022-12-06

## 2021-12-17 RX ORDER — ROSUVASTATIN CALCIUM 20 MG/1
20 TABLET, COATED ORAL DAILY
Qty: 90 TABLET | Refills: 3 | Status: SHIPPED | OUTPATIENT
Start: 2021-12-17 | End: 2022-12-06

## 2021-12-17 NOTE — TELEPHONE ENCOUNTER
"  Reason for Disposition    [1] Caller requesting a NON-URGENT new prescription or refill AND [2] triager unable to refill per unit policy    Additional Information    Negative: Drug overdose and triager unable to answer question    Negative: Caller requesting information unrelated to medicine    Negative: Caller requesting a prescription for Strep throat and has a positive culture result    Negative: Rash while taking a medication or within 3 days of stopping it    Negative: Immunization reaction suspected    Negative: [1] Asthma and [2] having symptoms of asthma (cough, wheezing, etc.)    Negative: [1] Influenza symptoms AND [2] anti-viral med prescription request, such as Tamiflu    Negative: [1] Symptom of illness (e.g., headache, abdominal pain, earache, vomiting) AND [2] more than mild    Negative: MORE THAN A DOUBLE DOSE of a prescription or over-the-counter (OTC) drug    Negative: [1] DOUBLE DOSE (an extra dose or lesser amount) of over-the-counter (OTC) drug AND [2] any symptoms (e.g., dizziness, nausea, pain, sleepiness)    Negative: [1] DOUBLE DOSE (an extra dose or lesser amount) of prescription drug AND [2] any symptoms (e.g., dizziness, nausea, pain, sleepiness)    Negative: Took another person's prescription drug    Negative: [1] DOUBLE DOSE (an extra dose or lesser amount) of prescription drug AND [2] NO symptoms (Exception: a double dose of antibiotics)    Negative: Diabetes drug error or overdose (e.g., took wrong type of insulin or took extra dose)    Negative: [1] Request for URGENT new prescription or refill of \"essential\" medication (i.e., likelihood of harm to patient if not taken) AND [2] triager unable to fill per unit policy    Negative: [1] Prescription not at pharmacy AND [2] was prescribed by PCP recently    Negative: [1] Pharmacy calling with prescription questions AND [2] triager unable to answer question    Negative: [1] Caller has URGENT medication question about med that PCP or " specialist prescribed AND [2] triager unable to answer question    Negative: [1] Caller has NON-URGENT medication question about med that PCP prescribed AND [2] triager unable to answer question    Protocols used: MEDICATION QUESTION CALL-A-AH

## 2021-12-17 NOTE — TELEPHONE ENCOUNTER
Clinic Action Needed:Yes    Reason for Call:Refill request for rosuvastatin and Allopurinol.    Patient reporting he has 3 days remaining on rosuvastatin and is completely out of Allopurinol.  Last office visit 4/5/21.    rosuvastatin (CRESTOR) 20 MG tablet 90 tablet 3 11/18/2020  No   Sig - Route: [ROSUVASTATIN (CRESTOR) 20 MG TABLET] Take 1 tablet (20 mg total) by      allopurinoL (ZYLOPRIM) 100 MG tablet 90 tablet 3 11/18/2020  No   Sig - Route: [ALLOPURINOL (ZYLOPRIM) 100 MG TABLET] Take 1 tablet (100 mg total) by mouth daily. - Oral     Gout Agents Protocol Failed 12/17/2021 07:44 AM   Protocol Details  Has Uric Acid on file in past 12 months and value is less than 6    Recent (12 mo) or future (30 days) visit within the authorizing provider's specialty    CBC on file in past 12 months    ALT on file in past 12 months    Medication is active on med list    Patient is age 18 or older    Normal serum creatinine on file in the past 12 months       To be filled at: None             COVID 19 Nurse Triage Plan/Patient Instructions    Please be aware that novel coronavirus (COVID-19) may be circulating in the community. If you develop symptoms such as fever, cough, or SOB or if you have concerns about the presence of another infection including coronavirus (COVID-19), please contact your health care provider or visit [email or web address].     Disposition/Instructions    Home care recommended. Follow home care protocol based instructions.    Thank you for taking steps to prevent the spread of this virus.  o Limit your contact with others.  o Wear a simple mask to cover your cough.  o Wash your hands well and often.    Resources    [facility]: About COVID-19: [email or web address]    CDC: What to Do If You're Sick: www.cdc.gov/coronavirus/2019-ncov/about/steps-when-sick.html    CDC: Ending Home Isolation: www.cdc.gov/coronavirus/2019-ncov/hcp/disposition-in-home-patients.html     CDC: Caring  for Someone: www.cdc.gov/coronavirus/2019-ncov/if-you-are-sick/care-for-someone.html     [facility]: Interim Guidance for Hospital Discharge to Home: www.[email or web address].[email or web address].[email or web address]/diseases/coronavirus/hcp/hospdischarge.pdf    [facility] clinical trials (COVID-19 research studies): [email or web address]-trials     Below are the COVID-19 hotlines at the [place] [facility] ([facility]). Interpreters are available.   o For health questions: Call 462-887-9373 or 1-282.676.6111 (7 a.m. to 7 p.m.)  o For questions about schools and childcare: Call 092-932-3444 or 1-891.591.3930 (7 a.m. to 7 p.m.)

## 2022-06-04 ENCOUNTER — HEALTH MAINTENANCE LETTER (OUTPATIENT)
Age: 57
End: 2022-06-04

## 2022-10-10 ENCOUNTER — HEALTH MAINTENANCE LETTER (OUTPATIENT)
Age: 57
End: 2022-10-10

## 2022-12-05 DIAGNOSIS — M10.9 GOUT, UNSPECIFIED CAUSE, UNSPECIFIED CHRONICITY, UNSPECIFIED SITE: ICD-10-CM

## 2022-12-05 DIAGNOSIS — E78.00 PURE HYPERCHOLESTEROLEMIA: ICD-10-CM

## 2022-12-06 RX ORDER — ROSUVASTATIN CALCIUM 20 MG/1
20 TABLET, COATED ORAL DAILY
Qty: 90 TABLET | Refills: 0 | Status: SHIPPED | OUTPATIENT
Start: 2022-12-06 | End: 2023-03-10

## 2022-12-06 RX ORDER — ALLOPURINOL 100 MG/1
100 TABLET ORAL DAILY
Qty: 90 TABLET | Refills: 0 | Status: SHIPPED | OUTPATIENT
Start: 2022-12-06 | End: 2023-03-10

## 2022-12-06 NOTE — TELEPHONE ENCOUNTER
"Routing refill request to provider for review/approval because:  Labs not current:  Multiple  Patient needs to be seen because it has been more than 1 year since last office visit.    Last Written Prescription Date:  12/17/21  Last Fill Quantity: 90,  # refills: 3   Last office visit provider:  4/5/21     Requested Prescriptions   Pending Prescriptions Disp Refills     rosuvastatin (CRESTOR) 20 MG tablet [Pharmacy Med Name: ROSUVASTATIN 20MG TABLETS] 90 tablet 3     Sig: TAKE 1 TABLET(20 MG) BY MOUTH DAILY       Statins Protocol Failed - 12/6/2022  9:36 AM        Failed - LDL on file in past 12 months     Recent Labs   Lab Test 04/05/21  1314   LDL 48             Failed - Recent (12 mo) or future (30 days) visit within the authorizing provider's specialty     Patient has had an office visit with the authorizing provider or a provider within the authorizing providers department within the previous 12 mos or has a future within next 30 days. See \"Patient Info\" tab in inbasket, or \"Choose Columns\" in Meds & Orders section of the refill encounter.              Passed - No abnormal creatine kinase in past 12 months     No lab results found.             Passed - Medication is active on med list        Passed - Patient is age 18 or older           allopurinol (ZYLOPRIM) 100 MG tablet [Pharmacy Med Name: ALLOPURINOL 100MG TABLETS] 90 tablet 3     Sig: TAKE 1 TABLET(100 MG) BY MOUTH DAILY       Gout Agents Protocol Failed - 12/5/2022  5:22 AM        Failed - CBC on file in past 12 months     Recent Labs   Lab Test 04/05/21  1314   WBC 4.4   RBC 5.13   HGB 15.2   HCT 45.8                    Failed - ALT on file in past 12 months     Recent Labs   Lab Test 04/05/21  1314   ALT 46*             Failed - Has Uric Acid on file in past 12 months and value is less than 6     No lab results found.  If level is 6mg/dL or greater, ok to refill one time and refer to provider.           Failed - Recent (12 mo) or future (30 days) " "visit within the authorizing provider's specialty     Patient has had an office visit with the authorizing provider or a provider within the authorizing providers department within the previous 12 mos or has a future within next 30 days. See \"Patient Info\" tab in inbasket, or \"Choose Columns\" in Meds & Orders section of the refill encounter.              Failed - Normal serum creatinine on file in the past 12 months     Recent Labs   Lab Test 04/05/21  1314   CR 0.78       Ok to refill medication if creatinine is low          Passed - Medication is active on med list        Passed - Patient is age 18 or older             Vick Henson RN 12/06/22 9:36 AM  "

## 2022-12-08 ENCOUNTER — TELEPHONE (OUTPATIENT)
Dept: INTERNAL MEDICINE | Facility: CLINIC | Age: 57
End: 2022-12-08

## 2022-12-08 DIAGNOSIS — R73.9 HYPERGLYCEMIA: Primary | ICD-10-CM

## 2022-12-08 DIAGNOSIS — E78.2 MIXED HYPERLIPIDEMIA: ICD-10-CM

## 2022-12-08 NOTE — TELEPHONE ENCOUNTER
Order/Referral Request    Who is requesting: Patient    Orders being requested: Lab    Reason service is needed/diagnosis: Per patient he is coming for follow up lab work which I don't see any enter in. Patient stated Dr. Jaquez will know.     When are orders needed by: ASAP    Has this been discussed with Provider: No    Does patient have a preference on a Group/Provider/Facility? [place]    Does patient have an appointment scheduled?: No, please call to schedule once lab work is enter in.     Where to send orders: N/A    Could we send this information to you in [employer] or would you prefer to receive a phone call?:   Patient would prefer a phone call   Okay to leave a detailed message?: Yes at Cell number on file:    Telephone Information:   Mobile [phone]

## 2022-12-14 NOTE — TELEPHONE ENCOUNTER
Spoke with patient who states he had his physical at the [facility] a few months ago and was told he should get his labs redrawn in 2-3 months as they were abnormal. [facility] told him he could go through his PCP for this.

## 2022-12-14 NOTE — TELEPHONE ENCOUNTER
Is he coming in for labs before physical?  Is he needing labs done for [facility]?  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  I be happy to place orders and if he is looking for routine physical type labs he can give a CBC without differential, comprehensive metabolic panel, TSH, fasting lipid panel, hemoglobin A1c, PSA screen, uric acid, urine analysis

## 2022-12-22 ENCOUNTER — LAB (OUTPATIENT)
Dept: LAB | Facility: CLINIC | Age: 57
End: 2022-12-22
Payer: COMMERCIAL

## 2022-12-22 DIAGNOSIS — E78.2 MIXED HYPERLIPIDEMIA: ICD-10-CM

## 2022-12-22 DIAGNOSIS — R73.9 HYPERGLYCEMIA: ICD-10-CM

## 2022-12-22 LAB
ALBUMIN SERPL BCG-MCNC: 4.6 G/DL (ref 3.5–5.2)
ALP SERPL-CCNC: 63 U/L (ref 40–129)
ALT SERPL W P-5'-P-CCNC: 43 U/L (ref 10–50)
ANION GAP SERPL CALCULATED.3IONS-SCNC: 10 MMOL/L (ref 7–15)
AST SERPL W P-5'-P-CCNC: 36 U/L (ref 10–50)
BILIRUB SERPL-MCNC: 0.7 MG/DL
BUN SERPL-MCNC: 18.3 MG/DL (ref 6–20)
CALCIUM SERPL-MCNC: 11.1 MG/DL (ref 8.6–10)
CHLORIDE SERPL-SCNC: 106 MMOL/L (ref 98–107)
CHOLEST SERPL-MCNC: 90 MG/DL
CREAT SERPL-MCNC: 0.96 MG/DL (ref 0.67–1.17)
DEPRECATED HCO3 PLAS-SCNC: 27 MMOL/L (ref 22–29)
GFR SERPL CREATININE-BSD FRML MDRD: >90 ML/MIN/1.73M2
GLUCOSE SERPL-MCNC: 95 MG/DL (ref 70–99)
HBA1C MFR BLD: 5.2 % (ref 0–5.6)
HDLC SERPL-MCNC: 43 MG/DL
LDLC SERPL CALC-MCNC: 32 MG/DL
NONHDLC SERPL-MCNC: 47 MG/DL
POTASSIUM SERPL-SCNC: 4.6 MMOL/L (ref 3.4–5.3)
PROT SERPL-MCNC: 7.3 G/DL (ref 6.4–8.3)
SODIUM SERPL-SCNC: 143 MMOL/L (ref 136–145)
TRIGL SERPL-MCNC: 75 MG/DL

## 2022-12-22 PROCEDURE — 80061 LIPID PANEL: CPT

## 2022-12-22 PROCEDURE — 80053 COMPREHEN METABOLIC PANEL: CPT

## 2022-12-22 PROCEDURE — 36415 COLL VENOUS BLD VENIPUNCTURE: CPT

## 2022-12-22 PROCEDURE — 83036 HEMOGLOBIN GLYCOSYLATED A1C: CPT

## 2023-03-08 DIAGNOSIS — E78.00 PURE HYPERCHOLESTEROLEMIA: ICD-10-CM

## 2023-03-08 DIAGNOSIS — M10.9 GOUT, UNSPECIFIED CAUSE, UNSPECIFIED CHRONICITY, UNSPECIFIED SITE: ICD-10-CM

## 2023-03-10 RX ORDER — ROSUVASTATIN CALCIUM 20 MG/1
TABLET, COATED ORAL
Qty: 90 TABLET | Refills: 0 | Status: SHIPPED | OUTPATIENT
Start: 2023-03-10 | End: 2023-06-13

## 2023-03-10 RX ORDER — ALLOPURINOL 100 MG/1
TABLET ORAL
Qty: 90 TABLET | Refills: 0 | Status: SHIPPED | OUTPATIENT
Start: 2023-03-10 | End: 2023-06-13

## 2023-03-10 NOTE — TELEPHONE ENCOUNTER
"Routing refill request to provider for review/approval because:  A break in medication  Patient needs to be seen because it has been more than 1 year since last office visit.    Last Written Prescription Date:  12/6/22  Last Fill Quantity: 90,  # refills: 0   Last office visit provider:   11/29/19    Requested Prescriptions   Pending Prescriptions Disp Refills     rosuvastatin (CRESTOR) 20 MG tablet [Pharmacy Med Name: ROSUVASTATIN 20MG TABLETS] 90 tablet 0     Sig: TAKE 1 TABLET(20 MG) BY MOUTH DAILY       Statins Protocol Failed - 3/8/2023  2:26 PM        Failed - Recent (12 mo) or future (30 days) visit within the authorizing provider's specialty     Patient has had an office visit with the authorizing provider or a provider within the authorizing providers department within the previous 12 mos or has a future within next 30 days. See \"Patient Info\" tab in inbasket, or \"Choose Columns\" in Meds & Orders section of the refill encounter.              Passed - LDL on file in past 12 months     Recent Labs   Lab Test 12/22/22  0814   LDL 32             Passed - No abnormal creatine kinase in past 12 months     No lab results found.             Passed - Medication is active on med list        Passed - Patient is age 18 or older           allopurinol (ZYLOPRIM) 100 MG tablet [Pharmacy Med Name: ALLOPURINOL 100MG TABLETS] 90 tablet 0     Sig: TAKE 1 TABLET(100 MG) BY MOUTH DAILY       Gout Agents Protocol Failed - 3/8/2023  2:26 PM        Failed - CBC on file in past 12 months     Recent Labs   Lab Test 04/05/21  1314   WBC 4.4   RBC 5.13   HGB 15.2   HCT 45.8                    Failed - Has Uric Acid on file in past 12 months and value is less than 6     No lab results found.  If level is 6mg/dL or greater, ok to refill one time and refer to provider.           Failed - Recent (12 mo) or future (30 days) visit within the authorizing provider's specialty     Patient has had an office visit with the authorizing " "provider or a provider within the authorizing providers department within the previous 12 mos or has a future within next 30 days. See \"Patient Info\" tab in inbasket, or \"Choose Columns\" in Meds & Orders section of the refill encounter.              Passed - ALT on file in past 12 months     Recent Labs   Lab Test 12/22/22 0814   ALT 43             Passed - Medication is active on med list        Passed - Patient is age 18 or older        Passed - Normal serum creatinine on file in the past 12 months     Recent Labs   Lab Test 12/22/22 0814   CR 0.96       Ok to refill medication if creatinine is low               Sandra Rodriguez, RN 03/09/23 10:38 PM  "

## 2023-06-10 ENCOUNTER — HEALTH MAINTENANCE LETTER (OUTPATIENT)
Age: 58
End: 2023-06-10

## 2023-06-13 DIAGNOSIS — E78.00 PURE HYPERCHOLESTEROLEMIA: ICD-10-CM

## 2023-06-13 DIAGNOSIS — M10.9 GOUT, UNSPECIFIED CAUSE, UNSPECIFIED CHRONICITY, UNSPECIFIED SITE: ICD-10-CM

## 2023-06-13 RX ORDER — ALLOPURINOL 100 MG/1
TABLET ORAL
Qty: 90 TABLET | Refills: 0 | Status: SHIPPED | OUTPATIENT
Start: 2023-06-13 | End: 2023-06-26

## 2023-06-13 NOTE — TELEPHONE ENCOUNTER
"Routing refill request to provider for review/approval because:  Patient needs to be seen because it has been more than two years since last office visit.    Last Written Prescription Date:  3/10/23  Last Fill Quantity: 90,  # refills: 0   Last office visit provider:   4/5/21    Requested Prescriptions   Pending Prescriptions Disp Refills     allopurinol (ZYLOPRIM) 100 MG tablet [Pharmacy Med Name: ALLOPURINOL 100MG TABLETS] 90 tablet 0     Sig: TAKE 1 TABLET(100 MG) BY MOUTH DAILY       Gout Agents Protocol Failed - 6/13/2023 12:32 PM        Failed - CBC on file in past 12 months     Recent Labs   Lab Test 04/05/21  1314   WBC 4.4   RBC 5.13   HGB 15.2   HCT 45.8                    Failed - Has Uric Acid on file in past 12 months and value is less than 6     No lab results found.  If level is 6mg/dL or greater, ok to refill one time and refer to provider.           Failed - Recent (12 mo) or future (30 days) visit within the authorizing provider's specialty     Patient has had an office visit with the authorizing provider or a provider within the authorizing providers department within the previous 12 mos or has a future within next 30 days. See \"Patient Info\" tab in inbasket, or \"Choose Columns\" in Meds & Orders section of the refill encounter.              Passed - ALT on file in past 12 months     Recent Labs   Lab Test 12/22/22  0814   ALT 43             Passed - Medication is active on med list        Passed - Patient is age 18 or older        Passed - Normal serum creatinine on file in the past 12 months     Recent Labs   Lab Test 12/22/22  0814   CR 0.96       Ok to refill medication if creatinine is low               ADITI STOVALL RN 06/13/23 12:32 PM  "

## 2023-06-14 RX ORDER — ROSUVASTATIN CALCIUM 20 MG/1
20 TABLET, COATED ORAL DAILY
Qty: 90 TABLET | Refills: 0 | Status: SHIPPED | OUTPATIENT
Start: 2023-06-14 | End: 2023-06-26

## 2023-06-14 NOTE — TELEPHONE ENCOUNTER
"Rx filled, has appointment scheduled 6/26/23 with PCP    Last Written Prescription Date:  3/10/23  Last Fill Quantity: 90,  # refills: 0   Last office visit provider:  4/5/21     Requested Prescriptions   Pending Prescriptions Disp Refills     rosuvastatin (CRESTOR) 20 MG tablet 90 tablet 3     Sig: Take 1 tablet (20 mg) by mouth daily       Statins Protocol Failed - 6/14/2023  3:20 PM        Failed - Recent (12 mo) or future (30 days) visit within the authorizing provider's specialty     Patient has had an office visit with the authorizing provider or a provider within the authorizing providers department within the previous 12 mos or has a future within next 30 days. See \"Patient Info\" tab in inbasket, or \"Choose Columns\" in Meds & Orders section of the refill encounter.              Passed - LDL on file in past 12 months     Recent Labs   Lab Test 12/22/22  0814   LDL 32             Passed - No abnormal creatine kinase in past 12 months     No lab results found.             Passed - Medication is active on med list        Passed - Patient is age 18 or older             Yuni yAala RN 06/14/23 3:24 PM  "

## 2023-06-26 ENCOUNTER — OFFICE VISIT (OUTPATIENT)
Dept: INTERNAL MEDICINE | Facility: CLINIC | Age: 58
End: 2023-06-26
Payer: COMMERCIAL

## 2023-06-26 VITALS
HEART RATE: 92 BPM | SYSTOLIC BLOOD PRESSURE: 118 MMHG | RESPIRATION RATE: 14 BRPM | BODY MASS INDEX: 27.35 KG/M2 | HEIGHT: 76 IN | TEMPERATURE: 97.3 F | WEIGHT: 224.6 LBS | OXYGEN SATURATION: 96 % | DIASTOLIC BLOOD PRESSURE: 74 MMHG

## 2023-06-26 DIAGNOSIS — I10 ESSENTIAL HYPERTENSION: ICD-10-CM

## 2023-06-26 DIAGNOSIS — G47.33 OSA ON CPAP: ICD-10-CM

## 2023-06-26 DIAGNOSIS — E83.52 HYPERCALCEMIA: ICD-10-CM

## 2023-06-26 DIAGNOSIS — E78.2 MIXED HYPERLIPIDEMIA: ICD-10-CM

## 2023-06-26 DIAGNOSIS — K76.0 HEPATIC STEATOSIS: ICD-10-CM

## 2023-06-26 DIAGNOSIS — L29.9 PRURITUS: ICD-10-CM

## 2023-06-26 DIAGNOSIS — M1A.09X0 IDIOPATHIC CHRONIC GOUT OF MULTIPLE SITES WITHOUT TOPHUS: ICD-10-CM

## 2023-06-26 DIAGNOSIS — Z00.00 ANNUAL PHYSICAL EXAM: Primary | ICD-10-CM

## 2023-06-26 DIAGNOSIS — I25.10 CORONARY ARTERY DISEASE INVOLVING NATIVE CORONARY ARTERY OF NATIVE HEART WITHOUT ANGINA PECTORIS: ICD-10-CM

## 2023-06-26 DIAGNOSIS — E11.9 TYPE 2 DIABETES MELLITUS WITHOUT COMPLICATION, WITHOUT LONG-TERM CURRENT USE OF INSULIN (H): ICD-10-CM

## 2023-06-26 DIAGNOSIS — N52.8 OTHER MALE ERECTILE DYSFUNCTION: ICD-10-CM

## 2023-06-26 PROBLEM — E78.00 PURE HYPERCHOLESTEROLEMIA: Status: ACTIVE | Noted: 2023-06-26

## 2023-06-26 PROBLEM — R74.02 NONSPECIFIC ELEVATION OF LEVELS OF TRANSAMINASE OR LACTIC ACID DEHYDROGENASE (LDH): Status: RESOLVED | Noted: 2023-06-26 | Resolved: 2023-06-26

## 2023-06-26 PROBLEM — I11.9 HYPERTENSIVE HEART DISEASE WITHOUT HEART FAILURE: Status: ACTIVE | Noted: 2022-10-10

## 2023-06-26 PROBLEM — R06.09 DYSPNEA ON EXERTION: Status: ACTIVE | Noted: 2022-10-10

## 2023-06-26 PROBLEM — R00.1 BRADYCARDIA, SINUS: Status: ACTIVE | Noted: 2022-10-10

## 2023-06-26 PROBLEM — R93.1 ELEVATED CORONARY ARTERY CALCIUM SCORE: Status: ACTIVE | Noted: 2017-02-07

## 2023-06-26 PROBLEM — R93.1 ELEVATED CORONARY ARTERY CALCIUM SCORE: Status: RESOLVED | Noted: 2017-02-07 | Resolved: 2023-06-26

## 2023-06-26 PROBLEM — R74.01 NONSPECIFIC ELEVATION OF LEVELS OF TRANSAMINASE OR LACTIC ACID DEHYDROGENASE (LDH): Status: RESOLVED | Noted: 2023-06-26 | Resolved: 2023-06-26

## 2023-06-26 PROBLEM — K92.1 HEMATOCHEZIA: Status: RESOLVED | Noted: 2022-10-10 | Resolved: 2023-06-26

## 2023-06-26 PROBLEM — E66.9 OBESITY WITH BODY MASS INDEX 30 OR GREATER: Status: ACTIVE | Noted: 2022-10-10

## 2023-06-26 PROBLEM — K92.1 HEMATOCHEZIA: Status: ACTIVE | Noted: 2022-10-10

## 2023-06-26 PROBLEM — M54.50 LOW BACK PAIN: Status: RESOLVED | Noted: 2022-10-10 | Resolved: 2023-06-26

## 2023-06-26 PROBLEM — F10.10 NONDEPENDENT ALCOHOL ABUSE: Status: ACTIVE | Noted: 2022-10-10

## 2023-06-26 PROBLEM — R07.89 ATYPICAL CHEST PAIN: Status: RESOLVED | Noted: 2017-02-07 | Resolved: 2023-06-26

## 2023-06-26 PROBLEM — R00.1 BRADYCARDIA, SINUS: Status: RESOLVED | Noted: 2022-10-10 | Resolved: 2023-06-26

## 2023-06-26 PROBLEM — M79.641 PAIN IN RIGHT HAND: Status: ACTIVE | Noted: 2022-10-10

## 2023-06-26 PROBLEM — F10.10 NONDEPENDENT ALCOHOL ABUSE: Status: RESOLVED | Noted: 2022-10-10 | Resolved: 2023-06-26

## 2023-06-26 PROBLEM — E66.9 OBESITY WITH BODY MASS INDEX 30 OR GREATER: Status: RESOLVED | Noted: 2022-10-10 | Resolved: 2023-06-26

## 2023-06-26 PROBLEM — M79.641 PAIN IN RIGHT HAND: Status: RESOLVED | Noted: 2022-10-10 | Resolved: 2023-06-26

## 2023-06-26 PROBLEM — R74.01 HIGH ASPARTATE AMINOTRANSFERASE LEVEL: Status: ACTIVE | Noted: 2022-10-10

## 2023-06-26 PROBLEM — B35.3 TINEA PEDIS: Status: ACTIVE | Noted: 2022-10-10

## 2023-06-26 PROBLEM — B35.3 TINEA PEDIS: Status: RESOLVED | Noted: 2022-10-10 | Resolved: 2023-06-26

## 2023-06-26 PROBLEM — M54.2 NECK PAIN: Status: ACTIVE | Noted: 2022-10-10

## 2023-06-26 PROBLEM — G47.62 SLEEP RELATED LEG CRAMPS: Status: ACTIVE | Noted: 2022-10-10

## 2023-06-26 PROBLEM — M54.50 LOW BACK PAIN: Status: ACTIVE | Noted: 2022-10-10

## 2023-06-26 PROBLEM — M54.2 NECK PAIN: Status: RESOLVED | Noted: 2022-10-10 | Resolved: 2023-06-26

## 2023-06-26 PROBLEM — E78.5 HYPERLIPIDEMIA: Status: ACTIVE | Noted: 2017-02-07

## 2023-06-26 PROBLEM — G47.62 SLEEP RELATED LEG CRAMPS: Status: RESOLVED | Noted: 2022-10-10 | Resolved: 2023-06-26

## 2023-06-26 PROBLEM — R07.89 ATYPICAL CHEST PAIN: Status: ACTIVE | Noted: 2017-02-07

## 2023-06-26 PROBLEM — R74.01 NONSPECIFIC ELEVATION OF LEVELS OF TRANSAMINASE OR LACTIC ACID DEHYDROGENASE (LDH): Status: ACTIVE | Noted: 2023-06-26

## 2023-06-26 PROBLEM — R74.02 NONSPECIFIC ELEVATION OF LEVELS OF TRANSAMINASE OR LACTIC ACID DEHYDROGENASE (LDH): Status: ACTIVE | Noted: 2023-06-26

## 2023-06-26 PROBLEM — R06.09 DYSPNEA ON EXERTION: Status: RESOLVED | Noted: 2022-10-10 | Resolved: 2023-06-26

## 2023-06-26 LAB
ALBUMIN SERPL BCG-MCNC: 5.3 G/DL (ref 3.5–5.2)
ALP SERPL-CCNC: 58 U/L (ref 40–129)
ALT SERPL W P-5'-P-CCNC: 59 U/L (ref 0–70)
ANION GAP SERPL CALCULATED.3IONS-SCNC: 12 MMOL/L (ref 7–15)
AST SERPL W P-5'-P-CCNC: 45 U/L (ref 0–45)
BILIRUB SERPL-MCNC: 0.5 MG/DL
BUN SERPL-MCNC: 18.6 MG/DL (ref 6–20)
CALCIUM SERPL-MCNC: 10.2 MG/DL (ref 8.6–10)
CHLORIDE SERPL-SCNC: 103 MMOL/L (ref 98–107)
CHOLEST SERPL-MCNC: 152 MG/DL
CREAT SERPL-MCNC: 0.93 MG/DL (ref 0.67–1.17)
CREAT UR-MCNC: 225 MG/DL
DEPRECATED CALCIDIOL+CALCIFEROL SERPL-MC: 48 UG/L (ref 20–75)
DEPRECATED HCO3 PLAS-SCNC: 25 MMOL/L (ref 22–29)
GFR SERPL CREATININE-BSD FRML MDRD: >90 ML/MIN/1.73M2
GLUCOSE SERPL-MCNC: 110 MG/DL (ref 70–99)
HBA1C MFR BLD: 5.4 % (ref 0–5.6)
HDLC SERPL-MCNC: 58 MG/DL
LDLC SERPL CALC-MCNC: 70 MG/DL
MICROALBUMIN UR-MCNC: 23 MG/L
MICROALBUMIN/CREAT UR: 10.22 MG/G CR (ref 0–17)
NONHDLC SERPL-MCNC: 94 MG/DL
POTASSIUM SERPL-SCNC: 4.6 MMOL/L (ref 3.4–5.3)
PROT SERPL-MCNC: 8 G/DL (ref 6.4–8.3)
PTH-INTACT SERPL-MCNC: 43 PG/ML (ref 15–65)
SODIUM SERPL-SCNC: 140 MMOL/L (ref 136–145)
TRIGL SERPL-MCNC: 120 MG/DL
TSH SERPL DL<=0.005 MIU/L-ACNC: 2.95 UIU/ML (ref 0.3–4.2)

## 2023-06-26 PROCEDURE — 80061 LIPID PANEL: CPT | Performed by: INTERNAL MEDICINE

## 2023-06-26 PROCEDURE — 83970 ASSAY OF PARATHORMONE: CPT | Performed by: INTERNAL MEDICINE

## 2023-06-26 PROCEDURE — 83036 HEMOGLOBIN GLYCOSYLATED A1C: CPT | Performed by: INTERNAL MEDICINE

## 2023-06-26 PROCEDURE — 84443 ASSAY THYROID STIM HORMONE: CPT | Performed by: INTERNAL MEDICINE

## 2023-06-26 PROCEDURE — 82306 VITAMIN D 25 HYDROXY: CPT | Performed by: INTERNAL MEDICINE

## 2023-06-26 PROCEDURE — 99214 OFFICE O/P EST MOD 30 MIN: CPT | Mod: 25 | Performed by: INTERNAL MEDICINE

## 2023-06-26 PROCEDURE — 36415 COLL VENOUS BLD VENIPUNCTURE: CPT | Performed by: INTERNAL MEDICINE

## 2023-06-26 PROCEDURE — 82570 ASSAY OF URINE CREATININE: CPT | Performed by: INTERNAL MEDICINE

## 2023-06-26 PROCEDURE — 80053 COMPREHEN METABOLIC PANEL: CPT | Performed by: INTERNAL MEDICINE

## 2023-06-26 PROCEDURE — 99396 PREV VISIT EST AGE 40-64: CPT | Performed by: INTERNAL MEDICINE

## 2023-06-26 PROCEDURE — 82043 UR ALBUMIN QUANTITATIVE: CPT | Performed by: INTERNAL MEDICINE

## 2023-06-26 RX ORDER — ASCORBIC ACID 500 MG
500 TABLET ORAL DAILY
COMMUNITY
End: 2023-12-27

## 2023-06-26 RX ORDER — ROSUVASTATIN CALCIUM 20 MG/1
20 TABLET, COATED ORAL DAILY
Qty: 90 TABLET | Refills: 4 | Status: SHIPPED | OUTPATIENT
Start: 2023-06-26 | End: 2024-07-16

## 2023-06-26 RX ORDER — INDOMETHACIN 50 MG/1
50 CAPSULE ORAL 2 TIMES DAILY WITH MEALS
Qty: 30 CAPSULE | Refills: 1 | Status: SHIPPED | OUTPATIENT
Start: 2023-06-26 | End: 2024-09-03

## 2023-06-26 RX ORDER — ALLOPURINOL 100 MG/1
100 TABLET ORAL DAILY
Qty: 90 TABLET | Refills: 4 | Status: SHIPPED | OUTPATIENT
Start: 2023-06-26 | End: 2024-07-16

## 2023-06-26 RX ORDER — TADALAFIL 5 MG/1
1 TABLET ORAL DAILY
COMMUNITY
Start: 2022-10-19 | End: 2023-11-20

## 2023-06-26 RX ORDER — VITAMIN B COMPLEX
1 TABLET ORAL DAILY
COMMUNITY
End: 2023-12-27

## 2023-06-26 ASSESSMENT — PAIN SCALES - GENERAL: PAINLEVEL: NO PAIN (0)

## 2023-06-26 NOTE — PROGRESS NOTES
Office Visit - Physical   Jared Rae   57 year old  male    Date of visit: 6/26/2023  Physician: Jacoby Jaquez MD     Assessment and Plan   1. Annual physical exam  This is a 57-year-old man with issues as discussed below    2. Type 2 diabetes mellitus without complication, without long-term current use of insulin (H)  He could consider holding Rybelsus to see if his itchy skin improves.  Could just have him switch to metformin if it does.  Annual diabetic eye exam excellent diabetic foot care  - B Complex Vitamins (VITAMIN B-COMPLEX) TABS; Take 1 tablet by mouth daily  - vitamin C (ASCORBIC ACID) 500 MG tablet; Take 500 mg by mouth daily  - Hemoglobin A1c; Future  - Albumin Random Urine Quantitative with Creat Ratio; Future  - Comprehensive metabolic panel; Future  - Lipid panel reflex to direct LDL Fasting; Future  - Semaglutide (RYBELSUS) 7 MG tablet; Take 1 tablet (7 mg) by mouth daily  Dispense: 90 tablet; Refill: 4  - Hemoglobin A1c  - Albumin Random Urine Quantitative with Creat Ratio  - Comprehensive metabolic panel  - Lipid panel reflex to direct LDL Fasting    3. Coronary artery disease involving native coronary artery of native heart without angina pectoris  Based on CT scanning, continue statin and aspirin    4. Essential hypertension  Blood pressure okay, continue same    5. Mixed hyperlipidemia  - rosuvastatin (CRESTOR) 20 MG tablet; Take 1 tablet (20 mg) by mouth daily  Dispense: 90 tablet; Refill: 4  - TSH; Future  - TSH    6. Hepatic steatosis  Recheck liver test    7. Idiopathic chronic gout of multiple sites without tophus  - allopurinol (ZYLOPRIM) 100 MG tablet; Take 1 tablet (100 mg) by mouth daily  Dispense: 90 tablet; Refill: 4  - indomethacin (INDOCIN) 50 MG capsule; Take 1 capsule (50 mg) by mouth 2 times daily (with meals)  Dispense: 30 capsule; Refill: 1    8. Pruritus  As above    9. Other male erectile dysfunction  - tadalafil (CIALIS) 5 MG tablet; Take 1 tablet by mouth  daily    10. SAM on CPAP    11. Hypercalcemia  Somewhat surprising last visit isolation calcium 11.1, does not take vitamin D or calcium supplementation.  Recheck labs  - Parathyroid Hormone Intact; Future  - Vitamin D Deficiency; Future  - Parathyroid Hormone Intact  - Vitamin D Deficiency      Return in about 6 months (around 12/26/2023) for Follow up.     Chief Complaint   Chief Complaint   Patient presents with     Recheck Medication     Medication Follow-up     6 month FU        Patient Profile   Social History     Social History Narrative    , wife Lakeshia.  Three daughters, Teresa Combs, Audrey.  Owns [employer].          Past Medical History   Patient Active Problem List   Diagnosis     Type 2 diabetes mellitus without complication, without long-term current use of insulin (H)     Other male erectile dysfunction     Idiopathic chronic gout of multiple sites without tophus     Hepatic steatosis     Mixed hyperlipidemia     Essential hypertension     SAM on CPAP     Pruritus     Pure hypercholesterolemia     Coronary artery disease involving native coronary artery of native heart without angina pectoris       Past Surgical History  He has a past surgical history that includes Laparoscopic cholecystectomy (N/A, 11/1/2016); Anterior Cruciate Ligament Repair (Bilateral, he was 24 yrs and 46 years old); and Inguinal Hernia Repair.     History of Present Illness   This 57 year old man comes in for annual follow-up.  Also seen at [facility] but will only be going down every couple of years.  Recently diagnosed with diabetes and made significant changes to lifestyle and his weight is improved, blood sugars improved.  He thinks he might have a little bit of a reaction to Rybelsus with itchy skin.  This is managed with cetirizine.  Has stopped drinking but now is drinking 8-10 drinks a week.  Which for him is a reduction from previous.    Review of Systems: A comprehensive review of systems  was negative except as noted.     Medications and Allergies   Current Outpatient Medications   Medication Sig Dispense Refill     allopurinol (ZYLOPRIM) 100 MG tablet Take 1 tablet (100 mg) by mouth daily 90 tablet 4     aspirin 81 MG EC tablet [ASPIRIN 81 MG EC TABLET] Take 1 tablet (81 mg total) by mouth daily. 90 tablet 3     indomethacin (INDOCIN) 50 MG capsule Take 1 capsule (50 mg) by mouth 2 times daily (with meals) 30 capsule 1     multivit-min/ferrous fumarate (MULTI VITAMIN ORAL) [MULTIVIT-MIN/FERROUS FUMARATE (MULTI VITAMIN ORAL)] Take by mouth.       mv-mn/iron/folic acid/herb 190 (VITAMIN D3 COMPLETE ORAL) [MV-MN/IRON/FOLIC ACID/HERB 190 (VITAMIN D3 COMPLETE ORAL)] Take 50 mcg by mouth 2 (two) times a day.       omega-3/dha/epa/fish oil (FISH OIL-OMEGA-3 FATTY ACIDS) 300-1,000 mg capsule [OMEGA-3/DHA/EPA/FISH OIL (FISH OIL-OMEGA-3 FATTY ACIDS) 300-1,000 MG CAPSULE] Take 2 g by mouth daily.       rosuvastatin (CRESTOR) 20 MG tablet Take 1 tablet (20 mg) by mouth daily 90 tablet 4     Semaglutide (RYBELSUS) 7 MG tablet Take 1 tablet (7 mg) by mouth daily 90 tablet 4     tadalafil (CIALIS) 5 MG tablet Take 1 tablet by mouth daily       B Complex Vitamins (VITAMIN B-COMPLEX) TABS Take 1 tablet by mouth daily       vitamin C (ASCORBIC ACID) 500 MG tablet Take 500 mg by mouth daily       No Known Allergies     Family and Social History   Family History   Problem Relation Age of Onset     No Known Problems Mother      Coronary Artery Disease Father      Congenital heart disease Sister      Lupus Sister      Coronary Artery Disease Brother      Coronary Artery Disease Brother      Coronary Artery Disease Brother      CABG Brother      No Known Problems Brother      No Known Problems Brother      No Known Problems Daughter      No Known Problems Daughter      No Known Problems Daughter         Social History     Tobacco Use     Smoking status: Never     Smokeless tobacco: Never   Vaping Use     Vaping Use: Never  "used   Substance Use Topics     Alcohol use: Yes     Alcohol/week: 10.0 standard drinks of alcohol     Drug use: No        Physical Exam   General Appearance:   No acute distress    /74 (BP Location: Left arm, Patient Position: Sitting, Cuff Size: Adult Large)   Pulse 92   Temp 97.3  F (36.3  C)   Resp 14   Ht 1.93 m (6' 4\")   Wt 101.9 kg (224 lb 9.6 oz)   SpO2 96%   BMI 27.34 kg/m      EYES: Eyelids, conjunctiva, and sclera were normal. Pupils were normal. Cornea, iris, and lens were normal bilaterally.  HEAD, EARS, NOSE, MOUTH, AND THROAT: Head and face were normal. Hearing was normal to voice and the ears were normal to external exam. Nose appearance was normal and there was no discharge. Oropharynx was normal.  NECK: Neck appearance was normal. There were no neck masses and the thyroid was not enlarged.  RESPIRATORY: Breathing pattern was normal and the chest moved symmetrically.  Percussion/auscultatory percussion was normal.  Lung sounds were normal and there were no abnormal sounds.  CARDIOVASCULAR: Heart rate and rhythm were normal.  S1 and S2 were normal and there were no extra sounds or murmurs. Peripheral pulses in arms and legs were normal.  Jugular venous pressure was normal.  There was no peripheral edema.  GASTROINTESTINAL: The abdomen was normal in contour.  Bowel sounds were present.  Percussion detected no organ enlargement or tenderness.  Palpation detected no tenderness, mass, or enlarged organs.   MUSCULOSKELETAL: Skeletal configuration was normal and muscle mass was normal for age. Joint appearance was overall normal.  LYMPHATIC: There were no enlarged nodes.  SKIN/HAIR/NAILS: Skin color was normal.  There were no skin lesions.  Hair and nails were normal.  NEUROLOGIC: The patient was alert and oriented to person, place, time, and circumstance. Speech was normal. Cranial nerves were normal. Motor strength was normal for age. The patient was normally coordinated.  PSYCHIATRIC:  Mood " and affect were normal and the patient had normal recent and remote memory. The patient's judgment and insight were normal.       Additional Information        Jacoby Jaquez MD  Internal Medicine  Contact me at [phone]  Answers for HPI/ROS submitted by the patient on 6/19/2023  What is the reason for your visit today? : General Check-up  How many servings of fruits and vegetables do you eat daily?: 2-3  On average, how many sweetened beverages do you drink each day (Examples: soda, juice, sweet tea, etc.  Do NOT count diet or artificially sweetened beverages)?: 1  How many minutes a day do you exercise enough to make your heart beat faster?: 30 to 60  How many days a week do you exercise enough to make your heart beat faster?: 5  How many days per week do you miss taking your medication?: 0

## 2023-08-07 ENCOUNTER — OFFICE VISIT (OUTPATIENT)
Dept: URGENT CARE | Facility: URGENT CARE | Age: 58
End: 2023-08-07
Payer: COMMERCIAL

## 2023-08-07 ENCOUNTER — NURSE TRIAGE (OUTPATIENT)
Dept: NURSING | Facility: CLINIC | Age: 58
End: 2023-08-07
Payer: COMMERCIAL

## 2023-08-07 ENCOUNTER — HOSPITAL ENCOUNTER (OUTPATIENT)
Dept: CT IMAGING | Facility: CLINIC | Age: 58
Discharge: HOME | End: 2023-08-07
Attending: FAMILY MEDICINE | Admitting: FAMILY MEDICINE
Payer: COMMERCIAL

## 2023-08-07 VITALS
DIASTOLIC BLOOD PRESSURE: 93 MMHG | WEIGHT: 231 LBS | OXYGEN SATURATION: 98 % | SYSTOLIC BLOOD PRESSURE: 152 MMHG | TEMPERATURE: 97 F | BODY MASS INDEX: 28.12 KG/M2 | HEART RATE: 63 BPM

## 2023-08-07 DIAGNOSIS — R30.0 DYSURIA: Primary | ICD-10-CM

## 2023-08-07 DIAGNOSIS — R31.0 GROSS HEMATURIA: ICD-10-CM

## 2023-08-07 LAB
ALBUMIN UR-MCNC: 100 MG/DL
APPEARANCE UR: ABNORMAL
BACTERIA #/AREA URNS HPF: ABNORMAL /HPF
BILIRUB UR QL STRIP: NEGATIVE
COLOR UR AUTO: ABNORMAL
GLUCOSE UR STRIP-MCNC: NEGATIVE MG/DL
HGB UR QL STRIP: ABNORMAL
KETONES UR STRIP-MCNC: NEGATIVE MG/DL
LEUKOCYTE ESTERASE UR QL STRIP: NEGATIVE
NITRATE UR QL: NEGATIVE
PH UR STRIP: 6 [PH] (ref 5–7)
RBC #/AREA URNS AUTO: >100 /HPF
SP GR UR STRIP: 1.02 (ref 1–1.03)
SQUAMOUS #/AREA URNS AUTO: ABNORMAL /LPF
UROBILINOGEN UR STRIP-ACNC: 0.2 E.U./DL
WBC #/AREA URNS AUTO: ABNORMAL /HPF

## 2023-08-07 PROCEDURE — 87086 URINE CULTURE/COLONY COUNT: CPT | Performed by: FAMILY MEDICINE

## 2023-08-07 PROCEDURE — 99214 OFFICE O/P EST MOD 30 MIN: CPT | Performed by: FAMILY MEDICINE

## 2023-08-07 PROCEDURE — 81001 URINALYSIS AUTO W/SCOPE: CPT

## 2023-08-07 PROCEDURE — 74176 CT ABD & PELVIS W/O CONTRAST: CPT

## 2023-08-07 RX ORDER — CETIRIZINE HYDROCHLORIDE 10 MG/1
10 TABLET ORAL
COMMUNITY

## 2023-08-07 RX ORDER — CEPHALEXIN 500 MG/1
500 CAPSULE ORAL 2 TIMES DAILY
Qty: 14 CAPSULE | Refills: 0 | Status: SHIPPED | OUTPATIENT
Start: 2023-08-07 | End: 2023-08-14

## 2023-08-07 NOTE — PROGRESS NOTES
Assessment & Plan     Dysuria  UA with small amount white cells. Given burning will treat as infectious with keflex. Culture pending.   - UA Macroscopic with reflex to Microscopic and Culture  - UA Microscopic with Reflex to Culture    Gross hematuria  No signs stones. No prostatic symptoms. Discussed exam but deferred. Possibly related to infection but needs repeat UA and referred to urology.   - CT Abdomen Pelvis w/o Contrast       [id number]}    No follow-ups on file.    Osman Reardon MD  [facility]    ------------------------------------------------------------------------  Subjective     Jared Rae presents to clinic today for the following health issues:  chief complaint  HPI  One day of tal hematuria. Wit hsoem burning as he urinates. No fever nor back nor abd pain. No urinary frequency, hesistancy, nocturia, weight loss.    No int travel. No sti risks. No trauma. No blood thinners. No other easy bleeding.       Review of Systems        Objective    BP (!) 152/93   Pulse 63   Temp 97  F (36.1  C) (Tympanic)   Wt 104.8 kg (231 lb)   SpO2 98%   BMI 28.12 kg/m    Physical Exam   GENERAL: healthy, alert and no distress  EYES: Eyes grossly normal to inspection   ABDOMEN: soft, nontender, no hepatosplenomegaly, no masses a  MS: no cva tenderness to palpation      Results for orders placed or performed during the hospital encounter of 08/07/23   CT Abdomen Pelvis w/o Contrast     Status: None    Narrative    EXAM: CT ABDOMEN PELVIS W/O CONTRAST  LOCATION: [facility]  DATE: 8/7/2023    INDICATION:  Gross hematuria  COMPARISON: CT 11/01/2016  TECHNIQUE: CT scan of the abdomen and pelvis was performed without IV contrast. Multiplanar reformats were obtained. Dose reduction techniques were used.  CONTRAST: None.    FINDINGS:   LOWER CHEST: Coronary artery calcification.    HEPATOBILIARY: No significant mass or bile duct dilatation. Cholecystectomy. Tiny dropped gallstone or  surgical clip along the posterior inferior right hepatic margin.    PANCREAS: Normal.    SPLEEN: Normal.    ADRENAL GLANDS: Normal.    KIDNEYS/BLADDER: Normal.    BOWEL: Normal.    LYMPH NODES: Prominent bilateral inguinal lymph nodes are presumed reactive.    VASCULATURE: Mild aortobiiliac atheromatous calcification..    PELVIC ORGANS: Vasectomy.    MUSCULOSKELETAL: Fat-containing right inguinal hernia..      Impression    IMPRESSION:   1.  No acute findings in the abdomen or pelvis.  2.  No urinary calculi.     Results for orders placed or performed in visit on 08/07/23   UA Macroscopic with reflex to Microscopic and Culture     Status: Abnormal    Specimen: Urine, Midstream   Result Value Ref Range    Color Urine Dark Yellow (A) Colorless, Straw, Light Yellow, Yellow    Appearance Urine Slightly Cloudy (A) Clear    Glucose Urine Negative Negative mg/dL    Bilirubin Urine Negative Negative    Ketones Urine Negative Negative mg/dL    Specific Gravity Urine 1.020 1.003 - 1.035    Blood Urine Large (A) Negative    pH Urine 6.0 5.0 - 7.0    Protein Albumin Urine 100 (A) Negative mg/dL    Urobilinogen Urine 0.2 0.2, 1.0 E.U./dL    Nitrite Urine Negative Negative    Leukocyte Esterase Urine Negative Negative   UA Microscopic with Reflex to Culture     Status: Abnormal   Result Value Ref Range    Bacteria Urine Few (A) None Seen /HPF    RBC Urine >100 (A) 0-2 /HPF /HPF    WBC Urine 5-10 (A) 0-5 /HPF /HPF    Squamous Epithelials Urine Few (A) None Seen /LPF    Narrative    Urine Culture not indicated

## 2023-08-07 NOTE — TELEPHONE ENCOUNTER
Patient calling. He has blood in his urine. He states that it burned at the end of his urine stream. Denies back or side pain. Denies feeling feverish. Denies injury to back, abdomen or genitals.     Care advice given for patient to be seen within 24 hours. Patient will go to [facility].     Alina Palmer RN  [facility]  August 7, 2023, 1:58 PM    Reason for Disposition   Pain or burning with passing urine    Additional Information   Negative: Shock suspected (e.g., cold/pale/clammy skin, too weak to stand, low BP, rapid pulse)   Negative: Sounds like a life-threatening emergency to the triager   Negative: Unable to urinate (or only a few drops) and bladder feels very full   Negative: Swollen scrotum   Negative: Pain in scrotum or testicle that persists > 1 hour   Negative: Fever > 100.4 F (38.0 C)   Negative: Vomiting   Negative: Patient sounds very sick or weak to the triager   Negative: SEVERE pain with urination   Negative: Side (flank) or lower back pain present   Negative: Taking antibiotic > 24 hours for UTI and fever persists   Negative: Taking antibiotic > 3 days for UTI and painful urination not improved   Negative: Taking treatment > 3 days for STI (e.g., penile discharge from gonorrhea, chlamydia) and painful urination not improved   Negative: All other males with painful urination, or patient wants to be seen   Negative: Taking antibiotic < 24 hours for UTI and fever persists   Negative: Taking antibiotic < 3 days for UTI and painful urination not improved   Negative: Taking antibiotic < 3 days for STI and painful urination not improved   Negative: Shock suspected (e.g., cold/pale/clammy skin, too weak to stand, low BP, rapid pulse)   Negative: Sounds like a life-threatening emergency to the triager   Negative: Urinary catheter, questions about   Negative: Recent back or abdominal injury   Negative: Recent genital injury   Negative: [1] Unable to urinate (or only a few drops) > 4 hours AND  [2] bladder feels very full (e.g., palpable bladder or strong urge to urinate)   Negative: Passing pure blood or large blood clots (i.e., size > a dime) (Exception: tania or small strands)   Negative: Fever > 100.4 F (38.0 C)   Negative: Patient sounds very sick or weak to the triager   Negative: [1] Pain or burning with passing urine AND [2] side (flank) or back pain present   Negative: Known sickle cell disease   Negative: Taking Coumadin (warfarin) or other strong blood thinner, or known bleeding disorder (e.g., thrombocytopenia)    Protocols used: Urination Pain - Male-A-OH, Urine - Blood In-A-AH

## 2023-08-09 LAB — BACTERIA UR CULT: NO GROWTH

## 2023-08-14 NOTE — TELEPHONE ENCOUNTER
MEDICAL RECORDS REQUEST   [facility]  Urology Clinic  [address] [place] [address]  PHONE: [phone]  Fax: [phone]        FUTURE VISIT INFORMATION                                                   Jared Rae, : 1965 scheduled for future visit at [facility]    APPOINTMENT INFORMATION:  Date: 2023  Provider:  Wilder Flores PA-C  Reason for Visit/Diagnosis: gross hematuria    REFERRAL INFORMATION:  Referring provider:  Osman Reardon MD in  URGENT CARE      RECORDS REQUESTED FOR VISIT                                                     NOTES  STATUS/DETAILS   OFFICE NOTE from referring provider  yes, 2023 -- Osman Reardon MD in  URGENT CARE   MEDICATION LIST  yes   LABS     URINALYSIS (UA)  yes   IMAGES  yes, 2023 -- CT ABD PELVIS     PRE-VISIT CHECKLIST      Joint diagnostic appointment coordinated correctly          (ensure right order & amount of time) Yes   RECORD COLLECTION COMPLETE Yes

## 2023-09-13 NOTE — PROGRESS NOTES
Consult conducted via real-time audio/video technology by Wilder Flores PA-C from [facility] to the patient in their home. Patient consented to this billed visit that was started at 3:07 PM and completed at 3:22 PM.    REASON FOR VISIT  Gross hematuria     HISTORY OF PRESENT ILLNESS  Mr. Rae is a 57 year old male who I am speaking with today in regards to his recent episode of gross blood in the urine.  His past medical history is significant for obstructive sleep apnea, hepatic steatosis, type 2 diabetes, idiopathic chronic gout, hyperlipidemia, hypertension, erectile dysfunction, and coronary artery disease.  I personally reviewed the most recent urgent care note from 8/7/2023 in preparation for this visit.    According to that note, Jared had been experiencing gross hematuria for one day with some dysuria.  He was preemptively started on Keflex after having urine testing completed.  He also had a CT abdomen pelvis without IV contrast completed which did not show any evidence of urolithiasis or hydronephrosis.  Urine testing returned without any evidence of growth, so he was referred to urology for further discussion and evaluation.    Today:  No history gross hematuria   Only one additional day of hematuria, none since  No pain with the gross hematuria outside of some very small burning   No associated urinary symptoms with gross hematuria   No history of kidney stones   No history of UTI   No history of retention   No pelvic pain    SOCIAL HISTORY  Denies any history of or current smoking     No chem exposure    FAMILY HISTORY   Denies any known family history of urologic malignancy     REVIEW OF SYSTEMS   Review of Systems   Constitutional:  Negative for activity change and unexpected weight change.   HENT:  Negative for ear pain and tinnitus.    Eyes:  Negative for visual disturbance.   Respiratory:  Negative for shortness of breath.    Cardiovascular:  Negative for chest pain.    Gastrointestinal:  Negative for abdominal pain and constipation.   Genitourinary:  Positive for hematuria. Negative for flank pain.   Musculoskeletal:  Negative for back pain.   Neurological:  Negative for dizziness and light-headedness.   Hematological:  Negative for adenopathy.   Psychiatric/Behavioral:  Negative for sleep disturbance.       PHYSICAL EXAMINATION  Deferred given virtual visit.    LABS   Latest Reference Range & Units 08/07/23 16:28   Color Urine Colorless, Straw, Light Yellow, Yellow  Dark Yellow !   Appearance Urine Clear  Slightly Cloudy !   Glucose Urine Negative mg/dL Negative   Bilirubin Urine Negative  Negative   Ketones Urine Negative mg/dL Negative   Specific Gravity Urine 1.003 - 1.035  1.020   pH Urine 5.0 - 7.0  6.0   Protein Albumin Urine Negative mg/dL 100 !   Urobilinogen Urine 0.2, 1.0 E.U./dL 0.2   Nitrite Urine Negative  Negative   Blood Urine Negative  Large !   Leukocyte Esterase Urine Negative  Negative   WBC Urine 0-5 /HPF /HPF 5-10 !   RBC Urine 0-2 /HPF /HPF >100 !   Bacteria Urine None Seen /HPF Few !   Squamous Epithelial /LPF Urine None Seen /LPF Few !   !: Data is abnormal    Urine culture associated with above urinalysis showed no growth    IMAGING  I personally reviewed and interpreted the below CT abdomen pelvis without IV contrast from 8/7/23 and do not note any urolithiasis or hydronephrosis     Narrative & Impression   EXAM: CT ABDOMEN PELVIS W/O CONTRAST  LOCATION: [facility]  DATE: 8/7/2023     INDICATION:  Gross hematuria  COMPARISON: CT 11/01/2016  TECHNIQUE: CT scan of the abdomen and pelvis was performed without IV contrast. Multiplanar reformats were obtained. Dose reduction techniques were used.  CONTRAST: None.     FINDINGS:   LOWER CHEST: Coronary artery calcification.     HEPATOBILIARY: No significant mass or bile duct dilatation. Cholecystectomy. Tiny dropped gallstone or surgical clip along the posterior inferior right  hepatic margin.     PANCREAS: Normal.     SPLEEN: Normal.     ADRENAL GLANDS: Normal.     KIDNEYS/BLADDER: Normal.     BOWEL: Normal.   LYMPH NODES: Prominent bilateral inguinal lymph nodes are presumed reactive.     VASCULATURE: Mild aortobiiliac atheromatous calcification..     PELVIC ORGANS: Vasectomy.     MUSCULOSKELETAL: Fat-containing right inguinal hernia..                                                                      IMPRESSION:   1.  No acute findings in the abdomen or pelvis.  2.  No urinary calculi.        IMPRESSION  Gross hematuria     It was my pleasure to speak with Mr. Raevirtually today regarding his recently discovered gross hematuria. We discussed possible etiologies of gross hematuria including both benign and malignant causes. We discussed the American Urological Association's recommendation for workup of gross and microscopic hematuria (visible blood vs. 3 or more red blood cells on urinalysis) which would include a CT urogram, cystoscopy, and in the case of gross hematuria, a urine cytology. I described these 2-3 tests to the Mr. Rae as well as the relative risks and benefits of each.    After reviewing the above information, Jared expressed understanding and agreement proceeding with a CT urogram, urine cytology, and cystoscopy.     If the hematuria evaluation is negative, the patient should undergo a repeat urinalysis in one year. If this repeat urinalysis is also negative for microscopic hematuria, no further workup is needed. If there is persistent microscopic hematuria, we will need to enter into shared decision making regarding repeat evaluation with CT Urogram and cystoscopy vs. Observation.      PLAN  CT Urogram and urine cytology with virtual visit shortly afterwards with me  First available cystoscopy.     SIGNED    Wilder Flores PA-C      I spent a total of 21 minutes spent on the date of the encounter doing chart review, history and exam, documentation, and  further activities as noted above.

## 2023-09-14 ENCOUNTER — PRE VISIT (OUTPATIENT)
Dept: UROLOGY | Facility: CLINIC | Age: 58
End: 2023-09-14

## 2023-09-14 ENCOUNTER — VIRTUAL VISIT (OUTPATIENT)
Dept: UROLOGY | Facility: CLINIC | Age: 58
End: 2023-09-14
Attending: STUDENT IN AN ORGANIZED HEALTH CARE EDUCATION/TRAINING PROGRAM
Payer: COMMERCIAL

## 2023-09-14 VITALS — HEIGHT: 76 IN | BODY MASS INDEX: 28.13 KG/M2 | WEIGHT: 231 LBS

## 2023-09-14 DIAGNOSIS — R30.0 DYSURIA: ICD-10-CM

## 2023-09-14 DIAGNOSIS — R31.0 GROSS HEMATURIA: Primary | ICD-10-CM

## 2023-09-14 PROCEDURE — 99204 OFFICE O/P NEW MOD 45 MIN: CPT | Mod: VID | Performed by: STUDENT IN AN ORGANIZED HEALTH CARE EDUCATION/TRAINING PROGRAM

## 2023-09-14 ASSESSMENT — ENCOUNTER SYMPTOMS
LIGHT-HEADEDNESS: 0
ADENOPATHY: 0
ACTIVITY CHANGE: 0
BACK PAIN: 0
SLEEP DISTURBANCE: 0
SHORTNESS OF BREATH: 0
UNEXPECTED WEIGHT CHANGE: 0
HEMATURIA: 1
FLANK PAIN: 0
CONSTIPATION: 0
ABDOMINAL PAIN: 0
DIZZINESS: 0

## 2023-09-14 ASSESSMENT — PAIN SCALES - GENERAL: PAINLEVEL: NO PAIN (0)

## 2023-09-14 NOTE — PROGRESS NOTES
Virtual Visit Details    Type of service:  Video Visit     Originating Location (pt. Location): Home    Distant Location (provider location):  Off-site  Platform used for Video Visit: [employer]

## 2023-09-14 NOTE — LETTER
9/14/2023       RE: Jared Rae  [address] [place] [id number]     Dear Colleague,    Thank you for referring your patient, Jared Rae, to the [facility] [place] [facility]. Please see a copy of my visit note below.    Consult conducted via real-time audio/video technology by Wilder Flores PA-C from [facility] to the patient in their home. Patient consented to this billed visit that was started at 3:07 PM and completed at 3:22 PM.    REASON FOR VISIT  Gross hematuria     HISTORY OF PRESENT ILLNESS  Mr. Rae is a 57 year old male who I am speaking with today in regards to his recent episode of gross blood in the urine.  His past medical history is significant for obstructive sleep apnea, hepatic steatosis, type 2 diabetes, idiopathic chronic gout, hyperlipidemia, hypertension, erectile dysfunction, and coronary artery disease.  I personally reviewed the most recent urgent care note from 8/7/2023 in preparation for this visit.    According to that note, Jared had been experiencing gross hematuria for one day with some dysuria.  He was preemptively started on Keflex after having urine testing completed.  He also had a CT abdomen pelvis without IV contrast completed which did not show any evidence of urolithiasis or hydronephrosis.  Urine testing returned without any evidence of growth, so he was referred to urology for further discussion and evaluation.    Today:  No history gross hematuria   Only one additional day of hematuria, none since  No pain with the gross hematuria outside of some very small burning   No associated urinary symptoms with gross hematuria   No history of kidney stones   No history of UTI   No history of retention   No pelvic pain    SOCIAL HISTORY  Denies any history of or current smoking     No chem exposure    FAMILY HISTORY   Denies any known family history of  urologic malignancy     REVIEW OF SYSTEMS   Review of Systems   Constitutional:  Negative for activity change and unexpected weight change.   HENT:  Negative for ear pain and tinnitus.    Eyes:  Negative for visual disturbance.   Respiratory:  Negative for shortness of breath.    Cardiovascular:  Negative for chest pain.   Gastrointestinal:  Negative for abdominal pain and constipation.   Genitourinary:  Positive for hematuria. Negative for flank pain.   Musculoskeletal:  Negative for back pain.   Neurological:  Negative for dizziness and light-headedness.   Hematological:  Negative for adenopathy.   Psychiatric/Behavioral:  Negative for sleep disturbance.       PHYSICAL EXAMINATION  Deferred given virtual visit.    LABS   Latest Reference Range & Units 08/07/23 16:28   Color Urine Colorless, Straw, Light Yellow, Yellow  Dark Yellow !   Appearance Urine Clear  Slightly Cloudy !   Glucose Urine Negative mg/dL Negative   Bilirubin Urine Negative  Negative   Ketones Urine Negative mg/dL Negative   Specific Gravity Urine 1.003 - 1.035  1.020   pH Urine 5.0 - 7.0  6.0   Protein Albumin Urine Negative mg/dL 100 !   Urobilinogen Urine 0.2, 1.0 E.U./dL 0.2   Nitrite Urine Negative  Negative   Blood Urine Negative  Large !   Leukocyte Esterase Urine Negative  Negative   WBC Urine 0-5 /HPF /HPF 5-10 !   RBC Urine 0-2 /HPF /HPF >100 !   Bacteria Urine None Seen /HPF Few !   Squamous Epithelial /LPF Urine None Seen /LPF Few !   !: Data is abnormal    Urine culture associated with above urinalysis showed no growth    IMAGING  I personally reviewed and interpreted the below CT abdomen pelvis without IV contrast from 8/7/23 and do not note any urolithiasis or hydronephrosis     Narrative & Impression   EXAM: CT ABDOMEN PELVIS W/O CONTRAST  LOCATION: [facility]  DATE: 8/7/2023     INDICATION:  Gross hematuria  COMPARISON: CT 11/01/2016  TECHNIQUE: CT scan of the abdomen and pelvis was performed without IV  contrast. Multiplanar reformats were obtained. Dose reduction techniques were used.  CONTRAST: None.     FINDINGS:   LOWER CHEST: Coronary artery calcification.     HEPATOBILIARY: No significant mass or bile duct dilatation. Cholecystectomy. Tiny dropped gallstone or surgical clip along the posterior inferior right hepatic margin.     PANCREAS: Normal.     SPLEEN: Normal.     ADRENAL GLANDS: Normal.     KIDNEYS/BLADDER: Normal.     BOWEL: Normal.   LYMPH NODES: Prominent bilateral inguinal lymph nodes are presumed reactive.     VASCULATURE: Mild aortobiiliac atheromatous calcification..     PELVIC ORGANS: Vasectomy.     MUSCULOSKELETAL: Fat-containing right inguinal hernia..                                                                      IMPRESSION:   1.  No acute findings in the abdomen or pelvis.  2.  No urinary calculi.        IMPRESSION  Gross hematuria     It was my pleasure to speak with Mr. Raevirtually today regarding his recently discovered gross hematuria. We discussed possible etiologies of gross hematuria including both benign and malignant causes. We discussed the American Urological Association's recommendation for workup of gross and microscopic hematuria (visible blood vs. 3 or more red blood cells on urinalysis) which would include a CT urogram, cystoscopy, and in the case of gross hematuria, a urine cytology. I described these 2-3 tests to the Mr. Rae as well as the relative risks and benefits of each.    After reviewing the above information, Jared expressed understanding and agreement proceeding with a CT urogram, urine cytology, and cystoscopy.     If the hematuria evaluation is negative, the patient should undergo a repeat urinalysis in one year. If this repeat urinalysis is also negative for microscopic hematuria, no further workup is needed. If there is persistent microscopic hematuria, we will need to enter into shared decision making regarding repeat evaluation with CT Urogram  and cystoscopy vs. Observation.      PLAN  CT Urogram and urine cytology with virtual visit shortly afterwards with me  First available cystoscopy.     SIGNED    Wilder Flores PA-C      I spent a total of 21 minutes spent on the date of the encounter doing chart review, history and exam, documentation, and further activities as noted above.    Virtual Visit Details    Type of service:  Video Visit     Originating Location (pt. Location): Home    Distant Location (provider location):  Off-site  Platform used for Video Visit: [employer]on

## 2023-09-14 NOTE — NURSING NOTE
Is the patient currently in the state of [place]? YES    Visit mode:VIDEO    If the visit is dropped, the patient can be reconnected by: VIDEO VISIT: Text to cell phone:   Telephone Information:   Mobile [phone]       Will anyone else be joining the visit? NO  (If patient encounters technical issues they should call [phone]    How would you like to obtain your AVS? MyChart    Are changes needed to the allergy or medication list? No    Reason for visit: Consult    Jenna HERNADEZ

## 2023-10-17 ENCOUNTER — TELEPHONE (OUTPATIENT)
Dept: UROLOGY | Facility: CLINIC | Age: 58
End: 2023-10-17
Payer: COMMERCIAL

## 2023-10-17 NOTE — TELEPHONE ENCOUNTER
Left detailed message with imaging number an info to schedule labs. If pt needs assistance scheduling I left my direct call back number as well.

## 2023-10-17 NOTE — TELEPHONE ENCOUNTER
----- Message from George Odom sent at 9/15/2023 10:51 AM CDT -----  Please call to schedule CT Urogram and then a lab same day.    He will wait until VV with Abdulaziz to schedule cysto.

## 2023-10-28 ENCOUNTER — HEALTH MAINTENANCE LETTER (OUTPATIENT)
Age: 58
End: 2023-10-28

## 2023-11-03 ENCOUNTER — LAB (OUTPATIENT)
Dept: LAB | Facility: CLINIC | Age: 58
End: 2023-11-03
Payer: COMMERCIAL

## 2023-11-03 DIAGNOSIS — E11.9 TYPE 2 DIABETES MELLITUS WITHOUT COMPLICATION, WITHOUT LONG-TERM CURRENT USE OF INSULIN (H): Primary | ICD-10-CM

## 2023-11-03 LAB — HBA1C MFR BLD: 5.6 % (ref 0–5.6)

## 2023-11-03 PROCEDURE — 83036 HEMOGLOBIN GLYCOSYLATED A1C: CPT

## 2023-11-03 PROCEDURE — 36415 COLL VENOUS BLD VENIPUNCTURE: CPT

## 2023-11-17 ENCOUNTER — TELEPHONE (OUTPATIENT)
Dept: INTERNAL MEDICINE | Facility: CLINIC | Age: 58
End: 2023-11-17
Payer: COMMERCIAL

## 2023-11-17 ENCOUNTER — HOSPITAL ENCOUNTER (OUTPATIENT)
Dept: CT IMAGING | Facility: CLINIC | Age: 58
Discharge: HOME | End: 2023-11-17
Attending: STUDENT IN AN ORGANIZED HEALTH CARE EDUCATION/TRAINING PROGRAM | Admitting: STUDENT IN AN ORGANIZED HEALTH CARE EDUCATION/TRAINING PROGRAM
Payer: COMMERCIAL

## 2023-11-17 ENCOUNTER — TELEPHONE (OUTPATIENT)
Dept: UROLOGY | Facility: CLINIC | Age: 58
End: 2023-11-17
Payer: COMMERCIAL

## 2023-11-17 DIAGNOSIS — R31.0 GROSS HEMATURIA: Primary | ICD-10-CM

## 2023-11-17 DIAGNOSIS — R31.0 GROSS HEMATURIA: ICD-10-CM

## 2023-11-17 PROCEDURE — 74178 CT ABD&PLV WO CNTR FLWD CNTR: CPT

## 2023-11-17 PROCEDURE — 250N000011 HC RX IP 250 OP 636: Mod: JZ | Performed by: STUDENT IN AN ORGANIZED HEALTH CARE EDUCATION/TRAINING PROGRAM

## 2023-11-17 RX ORDER — IOPAMIDOL 755 MG/ML
90 INJECTION, SOLUTION INTRAVASCULAR ONCE
Status: COMPLETED | OUTPATIENT
Start: 2023-11-17 | End: 2023-11-17

## 2023-11-17 RX ADMIN — IOPAMIDOL 90 ML: 755 INJECTION, SOLUTION INTRAVENOUS at 07:46

## 2023-11-17 NOTE — TELEPHONE ENCOUNTER
Reason for Call:  Medication or medication refill:    Do you use a [facility]?  Name of the pharmacy and phone number for the current request:  [facility]    Name of the medication requested: Tadifil    Other request: patient tried filling rx and contact Dr    Can we leave a detailed message on this number? YES    Phone number patient can be reached at: Cell number on file:    Telephone Information:   Mobile [phone]       Best Time: anytime    Call taken on 11/17/2023 at 5:11 PM by DIGNA WOLFF

## 2023-11-17 NOTE — TELEPHONE ENCOUNTER
ROCIOM and callback for pt to schedule a lab at their earliest convenience (UA and cytology), and cancelled 1/4/2024 VV per Abdulaziz Flores. Pt will be seeing Dr. Oswald for surgery

## 2023-11-17 NOTE — TELEPHONE ENCOUNTER
I contacted Jared with the results of his CT urogram, explaining that unfortunately there was a lesion seen in both posterior aspect of the bladder as well as in the right ureter.  With this in mind, I informed him that our recommendation would be to proceed with an operating room visit to complete a TURBT as well as a retrograde pyelogram and diagnostic ureteroscopy with possible biopsy.  I explained these procedures in detail as well as informed him of the risks including risk of anesthesia, risk of postoperative blood in the urine and mild pain with urination, the need for a stent in the right ureter following instrumentation, risk of infection, and risk of damage to surrounding structures.    After reviewing the above information, Jared expressed understanding agreement moving forward with a TURBT and diagnostic ureteroscopy first available with Dr. Oswald.    Vick, can you please assist me in helping Jared get scheduled for a first available urine cytology at the lab as well as getting him set up for surgery with Dr. Oswald?    Dr. Oswald, can you please place surgical orders for Jared?    Thank you all!

## 2023-11-20 ENCOUNTER — LAB (OUTPATIENT)
Dept: LAB | Facility: CLINIC | Age: 58
End: 2023-11-20
Payer: COMMERCIAL

## 2023-11-20 ENCOUNTER — PREP FOR PROCEDURE (OUTPATIENT)
Dept: UROLOGY | Facility: CLINIC | Age: 58
End: 2023-11-20

## 2023-11-20 DIAGNOSIS — R31.0 GROSS HEMATURIA: ICD-10-CM

## 2023-11-20 DIAGNOSIS — N28.89 URETERAL MASS: ICD-10-CM

## 2023-11-20 DIAGNOSIS — N32.89 BLADDER MASS: Primary | ICD-10-CM

## 2023-11-20 DIAGNOSIS — N52.8 OTHER MALE ERECTILE DYSFUNCTION: ICD-10-CM

## 2023-11-20 LAB
ALBUMIN UR-MCNC: NEGATIVE MG/DL
APPEARANCE UR: CLEAR
BACTERIA #/AREA URNS HPF: NORMAL /HPF
BILIRUB UR QL STRIP: NEGATIVE
COLOR UR AUTO: YELLOW
GLUCOSE UR STRIP-MCNC: NEGATIVE MG/DL
HGB UR QL STRIP: NEGATIVE
KETONES UR STRIP-MCNC: NEGATIVE MG/DL
LEUKOCYTE ESTERASE UR QL STRIP: NEGATIVE
NITRATE UR QL: NEGATIVE
PH UR STRIP: 6 [PH] (ref 5–8)
RBC #/AREA URNS AUTO: NORMAL /HPF
SP GR UR STRIP: 1.02 (ref 1–1.03)
UROBILINOGEN UR STRIP-ACNC: 0.2 E.U./DL
WBC #/AREA URNS AUTO: NORMAL /HPF

## 2023-11-20 PROCEDURE — 81001 URINALYSIS AUTO W/SCOPE: CPT

## 2023-11-20 RX ORDER — CEFAZOLIN SODIUM 2 G/50ML
2 SOLUTION INTRAVENOUS
Status: CANCELLED | OUTPATIENT
Start: 2023-11-20

## 2023-11-20 RX ORDER — CEFAZOLIN SODIUM 2 G/50ML
2 SOLUTION INTRAVENOUS SEE ADMIN INSTRUCTIONS
Status: CANCELLED | OUTPATIENT
Start: 2023-11-20

## 2023-11-20 RX ORDER — TADALAFIL 5 MG/1
5 TABLET ORAL DAILY
Qty: 90 TABLET | Refills: 4 | Status: SHIPPED | OUTPATIENT
Start: 2023-11-20

## 2023-11-20 NOTE — TELEPHONE ENCOUNTER
Please update quantity and directions.   Quality 265: Biopsy Follow-Up: Biopsy results reviewed, communicated, tracked, and documented Detail Level: Zone

## 2023-11-21 NOTE — TELEPHONE ENCOUNTER
This medication was ordered on 11/20/23.    STEPHANIE JosephN, RN-[facility]

## 2023-11-29 ENCOUNTER — TELEPHONE (OUTPATIENT)
Dept: UROLOGY | Facility: CLINIC | Age: 58
End: 2023-11-29
Payer: COMMERCIAL

## 2023-12-05 ENCOUNTER — TELEPHONE (OUTPATIENT)
Dept: LAB | Facility: CLINIC | Age: 58
End: 2023-12-05
Payer: COMMERCIAL

## 2023-12-05 NOTE — TELEPHONE ENCOUNTER
[id number]  mailbox is FULL could not leave vm to set up surg   [facility]  [phone]

## 2023-12-07 ENCOUNTER — TELEPHONE (OUTPATIENT)
Dept: ONCOLOGY | Facility: CLINIC | Age: 58
End: 2023-12-07
Payer: COMMERCIAL

## 2023-12-07 NOTE — TELEPHONE ENCOUNTER
1207  [facility] for pt to schedule surgery  [facility]  [phone]

## 2023-12-08 ENCOUNTER — TELEPHONE (OUTPATIENT)
Dept: UROLOGY | Facility: CLINIC | Age: 58
End: 2023-12-08
Payer: COMMERCIAL

## 2023-12-13 ENCOUNTER — TELEPHONE (OUTPATIENT)
Dept: ONCOLOGY | Facility: CLINIC | Age: 58
End: 2023-12-13
Payer: COMMERCIAL

## 2023-12-14 ENCOUNTER — TELEPHONE (OUTPATIENT)
Dept: ONCOLOGY | Facility: CLINIC | Age: 58
End: 2023-12-14
Payer: COMMERCIAL

## 2023-12-15 PROBLEM — N28.89 URETERAL MASS: Status: ACTIVE | Noted: 2023-11-20

## 2023-12-15 PROBLEM — N32.89 BLADDER MASS: Status: ACTIVE | Noted: 2023-11-20

## 2023-12-19 ENCOUNTER — TELEPHONE (OUTPATIENT)
Dept: UROLOGY | Facility: CLINIC | Age: 58
End: 2023-12-19
Payer: COMMERCIAL

## 2023-12-19 NOTE — TELEPHONE ENCOUNTER
Patient is scheduled for surgery with Dr. gallo    Spoke with: Bella    Date of Surgery: 01/10/24    [place] [facility] [place]    Informed patient they will need an adult  y    Pre op with Provider n    H&P: Scheduled with PAC on 12/27/23    Additional imaging/appointments: n    Surgery packet: to be given at pac appt     Additional comments: post op worked into donny DARDEN sched per him        Evi Espinoza on 12/19/2023 at 9:21 AM

## 2023-12-20 NOTE — TELEPHONE ENCOUNTER
FUTURE VISIT INFORMATION      SURGERY INFORMATION:  Date: 1/10/24  [facility]  Surgeon:  Abdelrahman Oswald MD   Anesthesia Type:  general  Procedure: CYSTOSCOPY, WITH INSTILLATION OF OPTICAL IMAGING AGENT INTO BLADDER AND TRANSURETHRAL RESECTION OF BLADDER TUMOR; RIGHT URETEROSCOPY WITH POSSIBLE URETERAL BIOPSY; RIGHT RETROGRADE PYELOGRAM; POSSIBLE RIGHT URETERAL STENT PLACEMENT     RECORDS REQUESTED FROM:       Primary Care Provider: eal    Pertinent Medical History: SAM. Hypertension, CAD    Most recent EKG+ Tracing: 3/2/20    Most recent Cardiac Stress Test: 10/12/22- [facility]

## 2023-12-27 ENCOUNTER — ANESTHESIA EVENT (OUTPATIENT)
Dept: SURGERY | Facility: AMBULATORY SURGERY CENTER | Age: 58
End: 2023-12-27
Payer: COMMERCIAL

## 2023-12-27 ENCOUNTER — VIRTUAL VISIT (OUTPATIENT)
Dept: SURGERY | Facility: CLINIC | Age: 58
End: 2023-12-27
Payer: COMMERCIAL

## 2023-12-27 ENCOUNTER — PRE VISIT (OUTPATIENT)
Dept: SURGERY | Facility: CLINIC | Age: 58
End: 2023-12-27

## 2023-12-27 DIAGNOSIS — Z01.818 PRE-OP EVALUATION: Primary | ICD-10-CM

## 2023-12-27 PROCEDURE — 99203 OFFICE O/P NEW LOW 30 MIN: CPT | Mod: 95 | Performed by: PHYSICIAN ASSISTANT

## 2023-12-27 ASSESSMENT — LIFESTYLE VARIABLES: TOBACCO_USE: 0

## 2023-12-27 ASSESSMENT — PAIN SCALES - GENERAL: PAINLEVEL: NO PAIN (0)

## 2023-12-27 ASSESSMENT — ENCOUNTER SYMPTOMS: SEIZURES: 0

## 2023-12-27 NOTE — PROGRESS NOTES
Jared is a 58 year old who is being evaluated via a billable video visit.      How would you like to obtain your AVS? MyChart  If the video visit is dropped, the invitation should be resent by: Send to e-mail at: [email or web address]          HPI           Review of Systems         Physical Exam

## 2023-12-27 NOTE — PATIENT INSTRUCTIONS
Preparing for Your Surgery      Name:  Jared Rae   MRN:  [id number]   :  1965   Today's Date:  2023         Arriving for surgery:  Surgery date:  01/10/2024  Arrival time:  8:15 am    Restrictions due to COVID 19:    Please maintain social distance.  Masking is optional.      parking is available for anyone with mobility limitations or disabilities. (Monday- Friday 7 am- 5 pm)    Please come to:    [facility]  [address] [place] [phone]    Please check in on the 5th floor at the Ambulatory Surgery Center.      What can I eat or drink?    -  You may eat and drink normally until 8 hours prior to arrival  time. (Until Midnight)  -  You may have clear liquids until 2 hours prior to arrival  time. (Until 6:15 am)    Examples of clear liquids:  Water  Clear broth  Juices (apple, white grape, white cranberry  and cider) without pulp  Noncarbonated, powder based beverages  (lemonade and Tyrone-Aid)  Sodas (Sprite, 7-Up, ginger ale and seltzer)  Coffee or tea (without milk or cream)  Gatorade      Which medicines can I take?    Hold Aspirin for 7 days before surgery.   Hold Multivitamins for 7 days before surgery.  Hold Supplements for 7 days before surgery. (Fish oil)  Hold Ibuprofen (Advil, Motrin) for 1 day before surgery--unless otherwise directed by surgeon.  Hold Naproxen (Aleve) for 4 days before surgery.    Hold Indocin for 24 hours before surgery     Hold Tadalafil for 24 hours before surgery     Hold Semaglutide (Rybelsus) the day before surgery (24) and the day of surgery (1/10/24)    No alcohol or cannabis products for 24 hours prior to procedure.      -  DO NOT take the following medications the day of surgery:  Cetirizine (Zyrtec)          -  PLEASE TAKE the following medications the day of surgery:   Allopurinol  Rosuvastatin       How do I prepare myself?  - Please take 2 showers (one the night prior to surgery and one the morning of  surgery) using Scrubcare or Hibiclens soap.    Use this soap only from the neck to your toes.     Leave the soap on your skin for one minute--then rinse thoroughly.      You may use your own shampoo and conditioner. No other hair products.   - Please remove all jewelry and body piercings.  - No lotions, deodorants or fragrance.  - No makeup or fingernail polish.   - Bring your ID and insurance card.    -If you have a Deep Brain Stimulator, a Spinal Cord Stimulator, or any implanted Neuro Device, you must bring the remote to the Surgery Center.         ALL PATIENTS ARE REQUIRED TO HAVE A RESPONSIBLE ADULT TO DRIVE AND BE IN ATTENDANCE WITH THEM FOR 24 HOURS FOLLOWING SURGERY.     Covid testing policy as of 12/06/2022  Your surgeon will notify and schedule you for a COVID test if one is needed before surgery--please direct any questions or COVID symptoms to your surgeon      Questions or Concerns:    -For questions regarding the day of surgery, please contact the Ambulatory Surgery Center at [phone].    -If you have health changes between today and your surgery, please contact your surgeon.     - For questions after surgery, please contact your surgeon's office.

## 2023-12-27 NOTE — H&P
Pre-Operative H & P     CC:  Preoperative exam to assess for increased cardiopulmonary risk while undergoing surgery and anesthesia.    Date of Encounter: 12/27/2023  Primary Care Physician:  Jacoby Jaquez     Reason for visit:   Encounter Diagnosis   Name Primary?    Pre-op evaluation Yes       HPI  Jared Rae is a 58 year old male who presents for pre-operative H & P in preparation for  Procedure Information       Case: [id number] Date/Time: 01/10/24 0950    Procedure: CYSTOSCOPY, WITH INSTILLATION OF OPTICAL IMAGING AGENT INTO BLADDER AND TRANSURETHRAL RESECTION OF BLADDER TUMOR; RIGHT URETEROSCOPY WITH POSSIBLE URETERAL BIOPSY; RIGHT RETROGRADE PYELOGRAM; POSSIBLE RIGHT URETERAL STENT PLACEMENT (Right: Urethra)    Anesthesia type: General    Diagnosis:       Bladder mass [N32.89]      Ureteral mass [N28.89]    Pre-op diagnosis:       Bladder mass [N32.89]      Ureteral mass [N28.89]    Location: [place] [address] / [facility]    Providers: Abdelrahman Oswald MD            Patient is being evaluated for comorbid conditions of CAD, hypertension, dyslipidemia, SAM, diabetes    Mr. Rae had a recent episode of gross hematuria. He was seen by Abdulaziz Flores PA-C for further evaluation. He is now scheduled for cystoscopy as above.     History is obtained from the patient and chart review    Hx of abnormal bleeding or anti-platelet use: ASA 81 mg      Past Medical History  Past Medical History:   Diagnosis Date    Arthritis 2012    Gout    CAD (coronary artery disease) 12/16/2016    Family history of myocardial infarction     High cholesterol     Hypertension        Past Surgical History  Past Surgical History:   Procedure Laterality Date    ANTERIOR CRUCIATE LIGAMENT REPAIR Bilateral he was 24 yrs and 46 years old    ACL bilateral knee ligiments    INGUINAL HERNIA REPAIR      he was 4 years old    LAPAROSCOPIC CHOLECYSTECTOMY N/A 11/1/2016    Procedure:  CHOLECYSTECTOMY LAPAROSCOPIC ;  Surgeon: Ezio Love MD;  Location: [facility] OR;  Service:        Prior to Admission Medications  Current Outpatient Medications   Medication Sig Dispense Refill    allopurinol (ZYLOPRIM) 100 MG tablet Take 1 tablet (100 mg) by mouth daily (Patient taking differently: Take 100 mg by mouth every morning) 90 tablet 4    aspirin 81 MG EC tablet [ASPIRIN 81 MG EC TABLET] Take 1 tablet (81 mg total) by mouth daily. (Patient taking differently: Take 81 mg by mouth every morning) 90 tablet 3    cetirizine (ZYRTEC) 10 MG tablet Take 10 mg by mouth every 48 hours      indomethacin (INDOCIN) 50 MG capsule Take 1 capsule (50 mg) by mouth 2 times daily (with meals) (Patient taking differently: Take 50 mg by mouth 2 times daily as needed) 30 capsule 1    multivit-min/ferrous fumarate (MULTI VITAMIN ORAL) Take 1 tablet by mouth every morning      omega-3/dha/epa/fish oil (FISH OIL-OMEGA-3 FATTY ACIDS) 300-1,000 mg capsule Take 2 g by mouth every morning      rosuvastatin (CRESTOR) 20 MG tablet Take 1 tablet (20 mg) by mouth daily (Patient taking differently: Take 20 mg by mouth every morning) 90 tablet 4    Semaglutide (RYBELSUS) 7 MG tablet Take 1 tablet (7 mg) by mouth daily (Patient taking differently: Take 7 mg by mouth every morning) 90 tablet 4    tadalafil (CIALIS) 5 MG tablet Take 1 tablet (5 mg) by mouth daily (Patient taking differently: Take 5 mg by mouth every morning) 90 tablet 4       Allergies  No Known Allergies    Social History  Social History     Socioeconomic History    Marital status:      Spouse name: Not on file    Number of children: 2    Years of education: Not on file    Highest education level: Not on file   Occupational History    Not on file   Tobacco Use    Smoking status: Never    Smokeless tobacco: Never   Vaping Use    Vaping Use: Never used   Substance and Sexual Activity    Alcohol use: Yes     Alcohol/week: 10.0 standard drinks of alcohol     Drug use: No    Sexual activity: Yes     Partners: Female   Other Topics Concern    Not on file   Social History Narrative    , wife Lakeshia.  Three daughters, Lauryn, Teresa, Audrey.  Owns [employer].       Social Determinants of Health     Financial Resource Strain: Not on file   Food Insecurity: Not on file   Transportation Needs: Not on file   Physical Activity: Not on file   Stress: Not on file   Social Connections: Not on file   Interpersonal Safety: Not on file   Housing Stability: Not on file       Family History  Family History   Problem Relation Age of Onset    No Known Problems Mother     Coronary Artery Disease Father     Congenital heart disease Sister     Lupus Sister     Coronary Artery Disease Brother     Deep Vein Thrombosis (DVT) Brother     Coronary Artery Disease Brother     Coronary Artery Disease Brother     CABG Brother     No Known Problems Brother     No Known Problems Brother     No Known Problems Daughter     No Known Problems Daughter     No Known Problems Daughter     Anesthesia Reaction No family hx of        Review of Systems  The complete review of systems is negative other than noted in the HPI or here.   Anesthesia Evaluation   Pt has had prior anesthetic.         ROS/MED HX  ENT/Pulmonary:     (+) sleep apnea, uses CPAP,                                   (-) tobacco use   Neurologic:  - neg neurologic ROS  (-) no seizures and no CVA   Cardiovascular:     (+) Dyslipidemia hypertension- -  CAD -  - -   Taking blood thinners                              Previous cardiac testing   Echo: Date: Results:    Stress Test:  Date: 10/2022 Results:  Final Impressions   1. STRESS IMPRESSIONS:   2. Exercise echocardiogram negative for myocardial ischemia.   3. The patient's exercise capacity was average, the patient achieved a workload of 10.5 METS and 99% FAC.   4. Ejection fraction response from 57% at rest to 70% at peak stress.   5. Left ventricular end-systolic  volume decreased with stress.   6. No regional wall motion abnormalities with stress.   7. Findings consistent with normal left ventricular filling pressure at rest and with exercise.   8. The stress ECG was negative for ischemia.   9. REST IMPRESSIONS:   10. Normal right ventricular chamber size and systolic function   11. Unable to detect peak tricuspid regurgitation velocity for pulmonary artery systolic pressure calculation.   12. No hemodynamically significant valvular heart disease.   13. Normal inferior vena cava size.   14. Normal mid ascending aorta diameter of 38 mm (normal for patient 41 mm).   15. No  pericardial effusion.     ECG Reviewed:  Date: 3/2020 Results:  NSR  Cath:  Date: Results:      METS/Exercise Tolerance: >4 METS Comment: Walk and running on a regular basis    Hematologic:  - neg hematologic  ROS  (-) history of blood clots and history of blood transfusion   Musculoskeletal:  - neg musculoskeletal ROS     GI/Hepatic:     (+)             liver disease (fatty liver),       Renal/Genitourinary: Comment: Gross hematuria       Endo: Comment: A1c 5.6   (-) chronic steroid usage   Psychiatric/Substance Use:  - neg psychiatric ROS  (-) psychiatric history   Infectious Disease:  - neg infectious disease ROS     Malignancy:       Other:            Virtual visit -  No vitals were obtained    Physical Exam  Constitutional: Awake, alert, cooperative, no apparent distress, and appears stated age.  HENT: Normocephalic  Respiratory: non labored breathing   Neurologic: Awake, alert, oriented to name, place and time.   Neuropsychiatric: Calm, cooperative. Normal affect.      Prior Labs/Diagnostic Studies   All labs and imaging personally reviewed     EKG/ stress test - if available please see in ROS above   No results found.       No data to display                  The patient's records and results personally reviewed by this provider.     Outside records reviewed from: Care  "Everywhere      Assessment    Jared Rae is a 58 year old male seen as a PAC referral for risk assessment and optimization for anesthesia.    Plan/Recommendations  Pt will be optimized for the proposed procedure.  See below for details on the assessment, risk, and preoperative recommendations    NEUROLOGY  - No history of TIA, CVA or seizure  -Post Op delirium risk factors:  No risk identified    ENT  - No current airway concerns.  Will need to be reassessed day of surgery.  Mallampati: Unable to assess  TM: Unable to assess    CARDIAC  -dyslipidemia using crestor  -previous cardiac testing (including normal stress test 2022) above  -denies cardiac symptoms. He is a runner and walks on a regular basis.   - METS (Metabolic Equivalents)  Patient performs 4 or more METS exercise without symptoms            Total Score: 0      RCRI-Very low risk: Class 1 0.4% complication rate            Total Score: 0        PULMONARY  - Obstructive Sleep Apnea  SAM with home CPAP.  Patient will be instructed to bring their home CPAP device to the hospital with them.  - Denies asthma or inhaler use  - Tobacco History    History   Smoking Status    Never   Smokeless Tobacco    Never       GI  PONV Medium Risk  Total Score: 2           1 AN PONV: Patient is not a current smoker    1 AN PONV: Intended Post Op Opioids        /RENAL  - Baseline Creatinine 0.9  -gross hematuria with the above procedure planned     ENDOCRINE    - BMI: Estimated body mass index is 28.13 kg/m  as calculated from the following:    Height as of 9/14/23: 1.93 m (6' 3.98\").    Weight as of 9/14/23: 104.8 kg (231 lb).  Overweight (BMI 25.0-29.9)  - last A1c  5.4 6/2023    HEME  VTE Medium Risk 1.8%            Total Score: 6    VTE: Male    VTE: Family Hx of VTE      - Platelet disfunction second to Aspirin (Eliseo, many others)    Different anesthesia methods/types have been discussed with the patient, but they are aware that the final plan will be decided by " the assigned anesthesia provider on the date of service.    The patient is optimized for their procedure. AVS with information on surgery time/arrival time, meds and NPO status given by nursing staff. No further diagnostic testing indicated.    Please refer to the physical examination documented by the anesthesiologist in the anesthesia record on the day of surgery.    Video-Visit Details    Type of service:  Video Visit    Provider received verbal consent for a Video Visit from the patient? Yes     Originating Location (pt. Location): Home    Distant Location (provider location):  Off-site  Mode of Communication:  Video Conference via [employer]  On the day of service:     Prep time: 15 minutes  Visit time: 10 minutes  Documentation time: 7 minutes  ------------------------------------------  Total time: 32 minutes      Kasandra Colvin PA-C  Preoperative Assessment Center  [facility]  Clinic and Surgery Center  Phone: [phone]  Fax: [phone]

## 2024-01-09 ASSESSMENT — LIFESTYLE VARIABLES: TOBACCO_USE: 0

## 2024-01-09 ASSESSMENT — ENCOUNTER SYMPTOMS: SEIZURES: 0

## 2024-01-09 NOTE — ANESTHESIA PREPROCEDURE EVALUATION
Anesthesia Pre-Procedure Evaluation    Patient: Jared Rae   MRN: [id number] : 1965        Procedure : Procedure(s):  CYSTOSCOPY, WITH INSTILLATION OF OPTICAL IMAGING AGENT INTO BLADDER AND TRANSURETHRAL RESECTION OF BLADDER TUMOR;  RIGHT URETEROSCOPY WITH POSSIBLE URETERAL BIOPSY; RIGHT RETROGRADE PYELOGRAM; POSSIBLE RIGHT URETERAL STENT PLACEMENT          Past Medical History:   Diagnosis Date     Arthritis     Gout     CAD (coronary artery disease) 2016     Family history of myocardial infarction      High cholesterol      Hypertension       Past Surgical History:   Procedure Laterality Date     ANTERIOR CRUCIATE LIGAMENT REPAIR Bilateral he was 24 yrs and 46 years old    ACL bilateral knee ligiments     INGUINAL HERNIA REPAIR      he was 4 years old     LAPAROSCOPIC CHOLECYSTECTOMY N/A 2016    Procedure: CHOLECYSTECTOMY LAPAROSCOPIC ;  Surgeon: Ezio Love MD;  Location: [facility];  Service:       No Known Allergies   Social History     Tobacco Use     Smoking status: Never     Smokeless tobacco: Never   Substance Use Topics     Alcohol use: Yes     Alcohol/week: 10.0 standard drinks of alcohol      Wt Readings from Last 1 Encounters:   23 104.8 kg (231 lb)        Anesthesia Evaluation   Pt has had prior anesthetic. Type: General.        ROS/MED HX  ENT/Pulmonary:     (+) sleep apnea, uses CPAP,                            recent URI, unresolved, 3 days of nasal congestion. no cough or fever:     (-) tobacco use   Neurologic:  - neg neurologic ROS  (-) no seizures and no CVA   Cardiovascular:     (+) Dyslipidemia hypertension- -  CAD -  - -   Taking blood thinners                              Previous cardiac testing   Echo: Date: Results:    Stress Test:  Date: 10/2022 Results:  Final Impressions   1. STRESS IMPRESSIONS:   2. Exercise echocardiogram negative for myocardial ischemia.   3. The patient's exercise capacity was average, the patient achieved a workload  of 10.5 METS and 99% FAC.   4. Ejection fraction response from 57% at rest to 70% at peak stress.   5. Left ventricular end-systolic volume decreased with stress.   6. No regional wall motion abnormalities with stress.   7. Findings consistent with normal left ventricular filling pressure at rest and with exercise.   8. The stress ECG was negative for ischemia.   9. REST IMPRESSIONS:   10. Normal right ventricular chamber size and systolic function   11. Unable to detect peak tricuspid regurgitation velocity for pulmonary artery systolic pressure calculation.   12. No hemodynamically significant valvular heart disease.   13. Normal inferior vena cava size.   14. Normal mid ascending aorta diameter of 38 mm (normal for patient 41 mm).   15. No  pericardial effusion.     ECG Reviewed:  Date: 3/2020 Results:  NSR  Cath:  Date: Results:      METS/Exercise Tolerance: >4 METS Comment: Walk and running on a regular basis    Hematologic:  - neg hematologic  ROS  (-) history of blood clots and history of blood transfusion   Musculoskeletal:  - neg musculoskeletal ROS     GI/Hepatic:     (+)             liver disease (fatty liver),       Renal/Genitourinary: Comment: Gross hematuria. Bladder tumor      Endo: Comment: A1c 5.6   (-) chronic steroid usage   Psychiatric/Substance Use: Comment: Drinks 10 drinks/week - neg psychiatric ROS  (-) psychiatric history   Infectious Disease:  - neg infectious disease ROS     Malignancy:  - neg malignancy ROS     Other:            Physical Exam    Airway        Mallampati: II   TM distance: > 3 FB   Neck ROM: full   Mouth opening: > 3 cm    Respiratory Devices and Support         Dental       (+) Multiple crowns, permanant bridges      Cardiovascular   cardiovascular exam normal          Pulmonary   pulmonary exam normal            OUTSIDE LABS:  CBC:   Lab Results   Component Value Date    WBC 4.4 04/05/2021    WBC 7.3 03/02/2020    HGB 15.2 04/05/2021    HGB 15.3 03/02/2020    HCT 45.8  "04/05/2021    HCT 44.6 03/02/2020     04/05/2021     03/02/2020     BMP:   Lab Results   Component Value Date     06/26/2023     12/22/2022    POTASSIUM 4.6 06/26/2023    POTASSIUM 4.6 12/22/2022    CHLORIDE 103 06/26/2023    CHLORIDE 106 12/22/2022    CO2 25 06/26/2023    CO2 27 12/22/2022    BUN 18.6 06/26/2023    BUN 18.3 12/22/2022    CR 0.93 06/26/2023    CR 0.96 12/22/2022     (H) 06/26/2023    GLC 95 12/22/2022     COAGS: No results found for: \"PTT\", \"INR\", \"FIBR\"  POC: No results found for: \"BGM\", \"HCG\", \"HCGS\"  HEPATIC:   Lab Results   Component Value Date    ALBUMIN 5.3 (H) 06/26/2023    PROTTOTAL 8.0 06/26/2023    ALT 59 06/26/2023    AST 45 06/26/2023    ALKPHOS 58 06/26/2023    BILITOTAL 0.5 06/26/2023     OTHER:   Lab Results   Component Value Date    A1C 5.6 11/03/2023    ESVIN 10.2 (H) 06/26/2023    TSH 2.95 06/26/2023    CRP 0.2 10/29/2019    SED 4 10/29/2019       Anesthesia Plan    ASA Status:  3    NPO Status:  NPO Appropriate    Anesthesia Type: General.     - Airway: LMA              Consents    Anesthesia Plan(s) and associated risks, benefits, and realistic alternatives discussed. Questions answered and patient/representative(s) expressed understanding.     - Discussed: Risks, Benefits and Alternatives for BOTH SEDATION and the PROCEDURE were discussed     - Discussed with:  Patient      - Extended Intubation/Ventilatory Support Discussed: No.      - Patient is DNR/DNI Status: No     Use of blood products discussed: No .     Postoperative Care    Pain management: IV analgesics, Oral pain medications.   PONV prophylaxis: Ondansetron (or other 5HT-3), Dexamethasone or Solumedrol, Background Propofol Infusion     Comments:               Denise Wesley MD    I have reviewed the pertinent notes and labs in the chart from the past 30 days and (re)examined the patient.  Any updates or changes from those notes are reflected in this note.                  "

## 2024-01-10 ENCOUNTER — ANESTHESIA (OUTPATIENT)
Dept: SURGERY | Facility: AMBULATORY SURGERY CENTER | Age: 59
End: 2024-01-10
Payer: COMMERCIAL

## 2024-01-10 ENCOUNTER — ANCILLARY PROCEDURE (OUTPATIENT)
Dept: RADIOLOGY | Facility: AMBULATORY SURGERY CENTER | Age: 59
End: 2024-01-10
Attending: UROLOGY
Payer: COMMERCIAL

## 2024-01-10 ENCOUNTER — HOSPITAL ENCOUNTER (OUTPATIENT)
Facility: AMBULATORY SURGERY CENTER | Age: 59
Discharge: HOME | End: 2024-01-10
Attending: UROLOGY
Payer: COMMERCIAL

## 2024-01-10 VITALS
OXYGEN SATURATION: 96 % | HEART RATE: 67 BPM | BODY MASS INDEX: 28.01 KG/M2 | HEIGHT: 76 IN | RESPIRATION RATE: 19 BRPM | TEMPERATURE: 97 F | WEIGHT: 230 LBS | DIASTOLIC BLOOD PRESSURE: 76 MMHG | SYSTOLIC BLOOD PRESSURE: 120 MMHG

## 2024-01-10 DIAGNOSIS — N32.89 BLADDER MASS: ICD-10-CM

## 2024-01-10 PROCEDURE — 52351 CYSTOURETERO & OR PYELOSCOPE: CPT | Mod: RT

## 2024-01-10 PROCEDURE — 52234 CYSTOSCOPY AND TREATMENT: CPT

## 2024-01-10 PROCEDURE — 88307 TISSUE EXAM BY PATHOLOGIST: CPT | Mod: TC | Performed by: UROLOGY

## 2024-01-10 PROCEDURE — 52351 CYSTOURETERO & OR PYELOSCOPE: CPT | Mod: RT | Performed by: UROLOGY

## 2024-01-10 PROCEDURE — 74420 UROGRAPHY RTRGR +-KUB: CPT | Mod: 26 | Performed by: UROLOGY

## 2024-01-10 PROCEDURE — 74420 UROGRAPHY RTRGR +-KUB: CPT | Mod: TC

## 2024-01-10 PROCEDURE — 52234 CYSTOSCOPY AND TREATMENT: CPT | Mod: 51 | Performed by: UROLOGY

## 2024-01-10 PROCEDURE — 88307 TISSUE EXAM BY PATHOLOGIST: CPT | Mod: 26 | Performed by: PATHOLOGY

## 2024-01-10 RX ORDER — KETOROLAC TROMETHAMINE 30 MG/ML
INJECTION, SOLUTION INTRAMUSCULAR; INTRAVENOUS PRN
Status: DISCONTINUED | OUTPATIENT
Start: 2024-01-10 | End: 2024-01-10

## 2024-01-10 RX ORDER — DEXAMETHASONE SODIUM PHOSPHATE 4 MG/ML
INJECTION, SOLUTION INTRA-ARTICULAR; INTRALESIONAL; INTRAMUSCULAR; INTRAVENOUS; SOFT TISSUE PRN
Status: DISCONTINUED | OUTPATIENT
Start: 2024-01-10 | End: 2024-01-10

## 2024-01-10 RX ORDER — ONDANSETRON 2 MG/ML
4 INJECTION INTRAMUSCULAR; INTRAVENOUS EVERY 30 MIN PRN
Status: DISCONTINUED | OUTPATIENT
Start: 2024-01-10 | End: 2024-01-11 | Stop reason: HOSPADM

## 2024-01-10 RX ORDER — OXYCODONE HYDROCHLORIDE 5 MG/1
10 TABLET ORAL
Status: DISCONTINUED | OUTPATIENT
Start: 2024-01-10 | End: 2024-01-11 | Stop reason: HOSPADM

## 2024-01-10 RX ORDER — CEFAZOLIN SODIUM 2 G/50ML
2 SOLUTION INTRAVENOUS
Status: COMPLETED | OUTPATIENT
Start: 2024-01-10 | End: 2024-01-10

## 2024-01-10 RX ORDER — HYDROMORPHONE HYDROCHLORIDE 1 MG/ML
0.2 INJECTION, SOLUTION INTRAMUSCULAR; INTRAVENOUS; SUBCUTANEOUS EVERY 5 MIN PRN
Status: DISCONTINUED | OUTPATIENT
Start: 2024-01-10 | End: 2024-01-10 | Stop reason: HOSPADM

## 2024-01-10 RX ORDER — CEFAZOLIN SODIUM 2 G/50ML
2 SOLUTION INTRAVENOUS SEE ADMIN INSTRUCTIONS
Status: DISCONTINUED | OUTPATIENT
Start: 2024-01-10 | End: 2024-01-10 | Stop reason: HOSPADM

## 2024-01-10 RX ORDER — IOPAMIDOL 612 MG/ML
INJECTION, SOLUTION INTRAVASCULAR PRN
Status: DISCONTINUED | OUTPATIENT
Start: 2024-01-10 | End: 2024-01-10 | Stop reason: HOSPADM

## 2024-01-10 RX ORDER — FENTANYL CITRATE 50 UG/ML
50 INJECTION, SOLUTION INTRAMUSCULAR; INTRAVENOUS EVERY 5 MIN PRN
Status: DISCONTINUED | OUTPATIENT
Start: 2024-01-10 | End: 2024-01-10 | Stop reason: HOSPADM

## 2024-01-10 RX ORDER — PROPOFOL 10 MG/ML
INJECTION, EMULSION INTRAVENOUS PRN
Status: DISCONTINUED | OUTPATIENT
Start: 2024-01-10 | End: 2024-01-10

## 2024-01-10 RX ORDER — LIDOCAINE 40 MG/G
CREAM TOPICAL
Status: DISCONTINUED | OUTPATIENT
Start: 2024-01-10 | End: 2024-01-10 | Stop reason: HOSPADM

## 2024-01-10 RX ORDER — SODIUM CHLORIDE, SODIUM LACTATE, POTASSIUM CHLORIDE, CALCIUM CHLORIDE 600; 310; 30; 20 MG/100ML; MG/100ML; MG/100ML; MG/100ML
INJECTION, SOLUTION INTRAVENOUS CONTINUOUS
Status: DISCONTINUED | OUTPATIENT
Start: 2024-01-10 | End: 2024-01-10 | Stop reason: HOSPADM

## 2024-01-10 RX ORDER — LABETALOL HYDROCHLORIDE 5 MG/ML
10 INJECTION, SOLUTION INTRAVENOUS
Status: DISCONTINUED | OUTPATIENT
Start: 2024-01-10 | End: 2024-01-10 | Stop reason: HOSPADM

## 2024-01-10 RX ORDER — FENTANYL CITRATE 50 UG/ML
25 INJECTION, SOLUTION INTRAMUSCULAR; INTRAVENOUS EVERY 5 MIN PRN
Status: DISCONTINUED | OUTPATIENT
Start: 2024-01-10 | End: 2024-01-10 | Stop reason: HOSPADM

## 2024-01-10 RX ORDER — FENTANYL CITRATE 50 UG/ML
25 INJECTION, SOLUTION INTRAMUSCULAR; INTRAVENOUS
Status: DISCONTINUED | OUTPATIENT
Start: 2024-01-10 | End: 2024-01-11 | Stop reason: HOSPADM

## 2024-01-10 RX ORDER — FENTANYL CITRATE 50 UG/ML
INJECTION, SOLUTION INTRAMUSCULAR; INTRAVENOUS PRN
Status: DISCONTINUED | OUTPATIENT
Start: 2024-01-10 | End: 2024-01-10

## 2024-01-10 RX ORDER — LIDOCAINE HYDROCHLORIDE 20 MG/ML
INJECTION, SOLUTION INFILTRATION; PERINEURAL PRN
Status: DISCONTINUED | OUTPATIENT
Start: 2024-01-10 | End: 2024-01-10

## 2024-01-10 RX ORDER — ONDANSETRON 2 MG/ML
INJECTION INTRAMUSCULAR; INTRAVENOUS PRN
Status: DISCONTINUED | OUTPATIENT
Start: 2024-01-10 | End: 2024-01-10

## 2024-01-10 RX ORDER — ONDANSETRON 2 MG/ML
4 INJECTION INTRAMUSCULAR; INTRAVENOUS EVERY 30 MIN PRN
Status: DISCONTINUED | OUTPATIENT
Start: 2024-01-10 | End: 2024-01-10 | Stop reason: HOSPADM

## 2024-01-10 RX ORDER — HYDROMORPHONE HYDROCHLORIDE 1 MG/ML
0.4 INJECTION, SOLUTION INTRAMUSCULAR; INTRAVENOUS; SUBCUTANEOUS EVERY 5 MIN PRN
Status: DISCONTINUED | OUTPATIENT
Start: 2024-01-10 | End: 2024-01-10 | Stop reason: HOSPADM

## 2024-01-10 RX ORDER — ONDANSETRON 4 MG/1
4 TABLET, ORALLY DISINTEGRATING ORAL EVERY 30 MIN PRN
Status: DISCONTINUED | OUTPATIENT
Start: 2024-01-10 | End: 2024-01-10 | Stop reason: HOSPADM

## 2024-01-10 RX ORDER — GLYCOPYRROLATE 0.2 MG/ML
INJECTION, SOLUTION INTRAMUSCULAR; INTRAVENOUS PRN
Status: DISCONTINUED | OUTPATIENT
Start: 2024-01-10 | End: 2024-01-10

## 2024-01-10 RX ORDER — MEPERIDINE HYDROCHLORIDE 25 MG/ML
12.5 INJECTION INTRAMUSCULAR; INTRAVENOUS; SUBCUTANEOUS EVERY 5 MIN PRN
Status: DISCONTINUED | OUTPATIENT
Start: 2024-01-10 | End: 2024-01-10 | Stop reason: HOSPADM

## 2024-01-10 RX ORDER — ONDANSETRON 4 MG/1
4 TABLET, ORALLY DISINTEGRATING ORAL EVERY 30 MIN PRN
Status: DISCONTINUED | OUTPATIENT
Start: 2024-01-10 | End: 2024-01-11 | Stop reason: HOSPADM

## 2024-01-10 RX ORDER — OXYCODONE HYDROCHLORIDE 5 MG/1
5 TABLET ORAL
Status: DISCONTINUED | OUTPATIENT
Start: 2024-01-10 | End: 2024-01-11 | Stop reason: HOSPADM

## 2024-01-10 RX ORDER — PROPOFOL 10 MG/ML
INJECTION, EMULSION INTRAVENOUS CONTINUOUS PRN
Status: DISCONTINUED | OUTPATIENT
Start: 2024-01-10 | End: 2024-01-10

## 2024-01-10 RX ORDER — ACETAMINOPHEN 325 MG/1
975 TABLET ORAL ONCE
Status: COMPLETED | OUTPATIENT
Start: 2024-01-10 | End: 2024-01-10

## 2024-01-10 RX ADMIN — Medication 20 MG: at 10:05

## 2024-01-10 RX ADMIN — GLYCOPYRROLATE 0.2 MG: 0.2 INJECTION, SOLUTION INTRAMUSCULAR; INTRAVENOUS at 09:17

## 2024-01-10 RX ADMIN — DEXAMETHASONE SODIUM PHOSPHATE 4 MG: 4 INJECTION, SOLUTION INTRA-ARTICULAR; INTRALESIONAL; INTRAMUSCULAR; INTRAVENOUS; SOFT TISSUE at 09:31

## 2024-01-10 RX ADMIN — PROPOFOL 125 MCG/KG/MIN: 10 INJECTION, EMULSION INTRAVENOUS at 09:55

## 2024-01-10 RX ADMIN — ACETAMINOPHEN 975 MG: 325 TABLET ORAL at 07:38

## 2024-01-10 RX ADMIN — PROPOFOL 150 MCG/KG/MIN: 10 INJECTION, EMULSION INTRAVENOUS at 09:25

## 2024-01-10 RX ADMIN — KETOROLAC TROMETHAMINE 30 MG: 30 INJECTION, SOLUTION INTRAMUSCULAR; INTRAVENOUS at 10:06

## 2024-01-10 RX ADMIN — PROPOFOL 250 MG: 10 INJECTION, EMULSION INTRAVENOUS at 09:25

## 2024-01-10 RX ADMIN — LIDOCAINE HYDROCHLORIDE 100 MG: 20 INJECTION, SOLUTION INFILTRATION; PERINEURAL at 09:25

## 2024-01-10 RX ADMIN — SODIUM CHLORIDE, SODIUM LACTATE, POTASSIUM CHLORIDE, CALCIUM CHLORIDE: 600; 310; 30; 20 INJECTION, SOLUTION INTRAVENOUS at 08:10

## 2024-01-10 RX ADMIN — CEFAZOLIN SODIUM 2 G: 2 SOLUTION INTRAVENOUS at 09:17

## 2024-01-10 RX ADMIN — FENTANYL CITRATE 50 MCG: 50 INJECTION, SOLUTION INTRAMUSCULAR; INTRAVENOUS at 09:25

## 2024-01-10 RX ADMIN — ONDANSETRON 4 MG: 2 INJECTION INTRAMUSCULAR; INTRAVENOUS at 09:31

## 2024-01-10 NOTE — DISCHARGE INSTRUCTIONS
[facility]  Home Care Following Anesthesia  For 24 hours after surgery:  Get plenty of rest.  A responsible adult must stay with you for at least 24 hours after you leave the surgery center.  Do not drive or use heavy equipment.  If you have weakness or tingling, don't drive or use heavy equipment until this feeling goes away.   Do not drink alcohol.   Avoid strenuous or risky activities.  Ask for help when climbing stairs.  You may feel lightheaded.  IF so, sit for a few minutes before standing.  Have someone help you get up.   If you have nausea (feel sick to your stomach): Drink only clear liquids such as apple juice, ginger ale, broth or 7-Up.  Rest may also help.  Be sure to drink enough fluids.  Move to a regular diet as you feel able.   You may have a slight fever.  Call the doctor if your fever is over 100 F (37.7 C) (taken under the tongue) or lasts longer than 24 hours.  You may have a dry mouth, a sore throat, muscle aches or trouble sleeping. These should go away after 24 hours.  Do not make important or legal decisions.   It is recommended to avoid smoking.         Tips for taking pain medications  To get the best pain relief possible, remember these points:  Take pain medications as directed, before pain becomes severe.  Pain medication can upset your stomach: taking it with food may help.  Constipation is a common side effect of pain medication. Drink plenty of  fluids.  Eat foods high in fiber. Take a stool softener if recommended by your doctor or pharmacist.  Do not drink alcohol, drive or operate machinery while taking pain medications.  Ask about other ways to control pain, such as with heat, ice or relaxation.    Tylenol/Acetaminophen Consumption    If you feel your pain relief is insufficient, you may take Tylenol/Acetaminophen in addition to your narcotic pain medication.   Be careful not to exceed 4,000 mg of Tylenol/Acetaminophen in a 24 hour period from  all sources.  If you are taking extra strength Tylenol/acetaminophen (500 mg), the maximum dose is 8 tablets in 24 hours.  If you are taking regular strength acetaminophen (325 mg), the maximum dose is 12 tablets in 24 hours.  You were last given 975mg of Tylenol at 7:30 am, you may take Tylenol again at 1:30 pm.    Call a doctor for any of the following:  Signs of infection (fever, growing tenderness at the surgery site, a large amount of drainage or bleeding, severe pain, foul-smelling drainage, redness, swelling).  It has been over 8 to 10 hours since surgery and you are still not able to urinate (pass water).  Headache for over 24 hours.  Numbness, tingling or weakness the day after surgery (if you had spinal anesthesia).  Signs of Covid-19 infection (temperature over 100 degrees, shortness of breath, cough, loss of taste/smell, generalized body aches, persistent headache, chills, sore throat, nausea/vomiting/diarrhea)  Your doctor is:    Dr. Abdelrahman Oswald, Prostate and Urology: [phone]               Or dial [phone] and ask for the resident on call for:  Prostate Urology  For emergency care, call the:  [place] [facility]:  [phone] (TTY for hearing impaired: [phone])                   (0) Normal; no sensory loss

## 2024-01-10 NOTE — BRIEF OP NOTE
[facility] Brief Operative Note    Pre-operative diagnosis: Bladder mass [N32.89]  Ureteral mass [N28.89]   Post-operative diagnosis Bladder Mass   Procedure: Procedure(s):  CYSTOSCOPY, WITH INSTILLATION OF OPTICAL IMAGING AGENT INTO BLADDER AND TRANSURETHRAL RESECTION OF BLADDER TUMOR;  RIGHT URETEROSCOPY WITH RIGHT RETROGRADE PYELOGRAM   Surgeon: Abdelrahman Oswald MD   Assistants(s): None   Estimated blood loss: Minimal    Specimens: ID Type Source Tests Collected by Time Destination   1 : Bladder Tumor Tissue Urinary Bladder SURGICAL PATHOLOGY EXAM Abdelrahman Oswald MD 1/10/2024 10:04 AM         Findings: Small bladder tumor. No ureteral lesion identified.    Home today.    Abdelrahman Oswald MD  Urology  [facility]

## 2024-01-10 NOTE — ANESTHESIA PROCEDURE NOTES
Airway       Patient location during procedure: OR  Staff -        Performed By: CRNAIndications and Patient Condition       Indications for airway management: meghna-procedural       Induction type:intravenous       Mask difficulty assessment: 1 - vent by mask    Final Airway Details       Final airway type: supraglottic airway    Supraglottic Airway Details        Type: LMA       Brand: I-Gel       LMA size: 5    Post intubation assessment        Placement verified by: capnometry and equal breath sounds        Number of attempts at approach: 1       Secured with: tape       Ease of procedure: easy       Dentition: Intact and Unchanged

## 2024-01-10 NOTE — ANESTHESIA POSTPROCEDURE EVALUATION
Patient: Jared Rae    Procedure: Procedure(s):  CYSTOSCOPY, WITH INSTILLATION OF OPTICAL IMAGING AGENT INTO BLADDER AND TRANSURETHRAL RESECTION OF BLADDER TUMOR;  RIGHT URETEROSCOPY WITH RIGHT RETROGRADE PYELOGRAM       Anesthesia Type:  General    Note:  Disposition: Outpatient   Postop Pain Control: Uneventful            Sign Out: Well controlled pain   PONV: No   Neuro/Psych: Uneventful            Sign Out: Acceptable/Baseline neuro status   Airway/Respiratory: Uneventful            Sign Out: Acceptable/Baseline resp. status   CV/Hemodynamics: Uneventful            Sign Out: Acceptable CV status; No obvious hypovolemia; No obvious fluid overload   Other NRE: NONE   DID A NON-ROUTINE EVENT OCCUR? No       Last vitals:  Vitals Value Taken Time   /88 01/10/24 1045   Temp 36.2  C (97.1  F) 01/10/24 1030   Pulse 67 01/10/24 1045   Resp 18 01/10/24 1045   SpO2 95 % 01/10/24 1045   Vitals shown include unfiled device data.    Electronically Signed By: Denise Wesley MD  January 10, 2024  12:07 PM

## 2024-01-10 NOTE — ANESTHESIA CARE TRANSFER NOTE
Patient: Jared Rae    Procedure: Procedure(s):  CYSTOSCOPY, WITH INSTILLATION OF OPTICAL IMAGING AGENT INTO BLADDER AND TRANSURETHRAL RESECTION OF BLADDER TUMOR;  RIGHT URETEROSCOPY WITH RIGHT RETROGRADE PYELOGRAM       Diagnosis: Bladder mass [N32.89]  Ureteral mass [N28.89]  Diagnosis Additional Information: No value filed.    Anesthesia Type:   General     Note:    Oropharynx: oropharynx clear of all foreign objects  Level of Consciousness: awake  Oxygen Supplementation: room air    Independent Airway: airway patency satisfactory and stable  Dentition: dentition unchanged  Vital Signs Stable: post-procedure vital signs reviewed and stable  Report to RN Given: handoff report given  Patient transferred to: PACU  Comments: VSS and WNL, comfortable no PONV, report to Jess RUSSELL  Handoff Report: Identifed the Patient, Identified the Reponsible Provider, Reviewed the pertinent medical history, Discussed the surgical course, Reviewed Intra-OP anesthesia mangement and issues during anesthesia, Set expectations for post-procedure period and Allowed opportunity for questions and acknowledgement of understanding      Vitals:  Vitals Value Taken Time   /81 01/10/24 1028   Temp     Pulse 77 01/10/24 1031   Resp 16 01/10/24 1031   SpO2 96 % 01/10/24 1031   Vitals shown include unfiled device data.    Electronically Signed By: GIOVANI Zavlaa CRNA  January 10, 2024  10:33 AM

## 2024-01-10 NOTE — OP NOTE
DATE OF SERVICE: 1/10/2024    PREOPERATIVE DIAGNOSIS: Bladder Cancer    POSTOPERATIVE DIAGNOSIS: Bladder Cancer    PROCEDURE PERFORMED:   1. Cystourethroscopy with transurethral resection of bladder tumor - size 2 cm  2. Instillation of optical imaging agent (Cysview) with Blue Light Cystoscopy  3. Right ureteroscopy  4. Right retrograde pyelogram with intraoperative interpretation of fluoroscopic imaging    ATTENDING SURGEON: Abdelrahman Oswald MD    FINDINGS: No suspicious right ureteral lesions identified. A 2 cm area of papillary bladder tumor was noted on the left lateral bladder wall. This was fully resected today.    ANESTHESIA: General.     ESTIMATED BLOOD LOSS: 1 mL.     SPECIMENS:   ID Type Source Tests Collected by Time Destination   1 : Bladder Tumor Tissue Urinary Bladder SURGICAL PATHOLOGY EXAM Abdelrahman Oswald MD 1/10/2024 10:04 AM      DRAINS: None     INDICATIONS FOR PROCEDURE: Jared Rae is a 58 year old male with a history of gross hematuria. On CT, he was noted to have a filling defect in the bladder and an indeterminate lesion in the right ureter. Here today for transurethral resection of bladder tumor and diagnostic ureteroscopy.  After discussion of the risks, benefits and alternatives of the procedure, the patient agreed to proceed.    DESCRIPTION OF PROCEDURE: After verifying informed consent, the patient was taken to the operating room. Adequate anesthesia was induced, he was repositioned in dorsal lithotomy position and was prepped and draped in standard sterile fashion. A timeout was performed to verify correct patient and procedure. Pneumoboots and perioperative antibiotics were administered before the procedure commenced.     A 22 Kiswahili cystoscope was inserted into the bladder. Cysview had been instilled pre-operatively and was drained upon scope insertion. Urethra was unremarkable. Mild prostatomegaly noted. Bladder was evaluated with white light cystoscopy and a 2 cm  area of papillary bladder tumor was identified on the left lateral wall, lateral to the ureteral orifice. No other tumors were noted. Bladder was again evaluated with blue light, and aside from the previously identified tumor, no additional lesions were identified.    A sensor wire was advanced into the right ureteral orifice and open ended catheter used to intubate the distal ureter. A retrograde pyelogram was completed which demonstrated no filling defects or hydronephrosis. A semirigid ureteroscope was then advanced alongside the wire to the ureteropelvic junction. Careful pullback ureteroscopy was completed, evaluating the entire right ureter, and no suspicious lesions were noted. Wire removed.    A 26 Fr resectoscope with a visual obturator was inserted. I then proceeded to resect all papillary tumor and all suspicious mucosa. This included a 2 cm area on the left lateral wall, with trabeculation and small cellules in the area. The entire area of resection was 2 cm. All suspicious mucosa was resected during today's procedure. Once resection had been completed the scope was used to remove the bladder tumor chips. I then reintroduced the resectoscope to ensure meticulous hemostasis of the resection bed. The ureteral orifices were both visualized and noted to be uninjured prior to concluding the procedure.    The patient was awakened from anesthesia. He was transferred to the recovery room in stable condition.     POSTOPERATIVE PLAN: Home today. Follow-up in 1-2 weeks for pathology review.     Abdelrahman Oswald MD  Urology  [facility]

## 2024-01-11 DIAGNOSIS — E78.2 MIXED HYPERLIPIDEMIA: ICD-10-CM

## 2024-01-11 LAB
PATH REPORT.COMMENTS IMP SPEC: ABNORMAL
PATH REPORT.COMMENTS IMP SPEC: ABNORMAL
PATH REPORT.COMMENTS IMP SPEC: YES
PATH REPORT.FINAL DX SPEC: ABNORMAL
PATH REPORT.GROSS SPEC: ABNORMAL
PATH REPORT.RELEVANT HX SPEC: ABNORMAL
PHOTO IMAGE: ABNORMAL

## 2024-01-12 RX ORDER — ROSUVASTATIN CALCIUM 20 MG/1
20 TABLET, COATED ORAL DAILY
Qty: 90 TABLET | Refills: 4 | OUTPATIENT
Start: 2024-01-12

## 2024-01-24 ENCOUNTER — PRE VISIT (OUTPATIENT)
Dept: UROLOGY | Facility: CLINIC | Age: 59
End: 2024-01-24
Payer: COMMERCIAL

## 2024-01-24 NOTE — TELEPHONE ENCOUNTER
Reason for visit: follow up      Dx/Hx/Sx: gross hematuria, bladder mass     Records/imaging/labs/orders: in epic     At Rooming: standard

## 2024-01-30 ENCOUNTER — TELEPHONE (OUTPATIENT)
Dept: ONCOLOGY | Facility: CLINIC | Age: 59
End: 2024-01-30

## 2024-01-30 NOTE — TELEPHONE ENCOUNTER
Pt currently in [place], Pt was unaware he needed to be in state for appt. Please reschedule virtual appt when pt is back in [place]    Patient needs to be rescheduled for their virtual visit due to Reason for Reschedule: Out-of-State    Appointment mode: Video  Provider: Abdelrahman Oswald MD

## 2024-01-31 ENCOUNTER — TELEPHONE (OUTPATIENT)
Dept: UROLOGY | Facility: CLINIC | Age: 59
End: 2024-01-31
Payer: COMMERCIAL

## 2024-01-31 NOTE — TELEPHONE ENCOUNTER
Left Voicemail (1st Attempt) for the patient to call back and schedule the following:    Appointment type: Post op  Provider: Dr. Oswald  Return date: Feb 6th  Specialty phone number: direct [phone]  Additional appointment(s) needed: N/A  Additonal Notes: Rescheduled Post op

## 2024-01-31 NOTE — TELEPHONE ENCOUNTER
[facility] Call Center    Phone Message    May a detailed message be left on voicemail: yes     Reason for Call: Pt daughter calling to reschedule post-op. Stated anytime virtual or in person at [facility] would work. She said my chart would work if we have a date and time we can send it there.    Action Taken: Message routed to:  [facility] ([facility]): Uro    Travel Screening: Not Applicable

## 2024-02-06 ENCOUNTER — VIRTUAL VISIT (OUTPATIENT)
Dept: UROLOGY | Facility: CLINIC | Age: 59
End: 2024-02-06
Payer: COMMERCIAL

## 2024-02-06 DIAGNOSIS — C67.2 MALIGNANT NEOPLASM OF LATERAL WALL OF URINARY BLADDER (H): Primary | ICD-10-CM

## 2024-02-06 PROCEDURE — 99213 OFFICE O/P EST LOW 20 MIN: CPT | Mod: 95 | Performed by: UROLOGY

## 2024-02-06 ASSESSMENT — PAIN SCALES - GENERAL: PAINLEVEL: NO PAIN (0)

## 2024-02-06 NOTE — LETTER
2/6/2024       RE: Jared Rae  [address] [place] [id number]     Dear Colleague,    Thank you for referring your patient, Jared Rae, to the [facility]. Please see a copy of my visit note below.    Jared Rae is a 58 year old male who is being evaluated via a billable video visit.      How would you like to obtain your AVS? MyChart  If the video visit is dropped, the invitation should be resent by: Text to cell phone: [phone]  Will anyone else be joining your video visit? No    Video Start Time: 4:38 PM    CHIEF COMPLAINT   It was my pleasure to see Jared Rae who is a 58 year old male for follow-up of bladder cancer.      HPI   Jared Rae is a very pleasant 58 year old male who presents with a history of bladder cancer. Had TURBT and right ureteroscopy 1/10/2024. There were no suspicious lesions noted on direct visualization of right ureter on ureteroscopy. Bladder tumor resected and LG Ta disease. Has had some intermittent hematuria since surgery, last about 1 week ago. Urine now clear.    TURBT 1/10/2024  Final Diagnosis   A. Bladder Tumor:  - Low-grade papillary urothelial carcinoma, noninvasive  - No muscularis propria seen     CT Urogram 11/17/2023  IMPRESSION:  1.  Papillary filling defect involving the left posterior bladder lateral to the UVJ suspicious for possible urothelial neoplasm. Additional indeterminate anti-dependent excretory phase filling defect in the mid right ureter. Recommend further   investigation with cystoscopy/ureteroscopy.         Allergies:   Patient has no known allergies.         Review of Systems:  From intake questionnaire     Skin: negative  Eyes: negative  Ears/Nose/Throat: negative  Respiratory: No shortness of breath, dyspnea on exertion, cough, or hemoptysis  Cardiovascular: No chest pain or palpitations  Gastrointestinal:  negative; no nausea/vomiting, constipation or diarrhea  Genitourinary: as per HPI  Musculoskeletal: negative  Neurologic: negative  Psychiatric: negative  Hematologic/Lymphatic/Immunologic: negative  Endocrine: negative         Physical Exam:     Vitals:  No vitals were obtained today due to virtual visit.    Physical Exam   EYES: Eyes grossly normal to inspection.  No discharge or erythema, or obvious scleral/conjunctival abnormalities.  SKIN: Visible skin clear. No significant rash, abnormal pigmentation or lesions.  NEURO: Cranial nerves grossly intact.  Mentation and speech appropriate for age.  GENERAL: Healthy, alert and no distress  RESP: No audible wheeze, cough, or visible cyanosis.  No visible retractions or increased work of breathing.    PSYCH: Mentation appears normal, affect normal/bright, judgement and insight intact, normal speech and appearance well-groomed.      Outside and Past Medical records:    Review of the result(s) of each unique test - pathology         Assessment and Plan:     58 year old male with LG Ta urothelial carcinoma of the bladder, TURBT 1/10/2024. No lesion noted on ureteroscopy of right ureter despite indeterminate lesion on CT. We discussed his diagnosis, the pathology, and role for ongoing surveillance.    Plan:  Follow-up 3 months with office cystoscopy    Video-Visit Details    Type of service:  Video Visit    Video End Time:4:49 PM    Originating Location (pt. Location): Home    Distant Location (provider location):  On-site    Platform used for Video Visit: Colatris    20 minutes spent on the date of the encounter including direct interaction with the patient, performing chart review, history and exam, documentation and further activities as noted above.    Abdelrahman Oswald MD  Urology  [facility]

## 2024-02-06 NOTE — NURSING NOTE
Is the patient currently in the state of [place]? YES    Visit mode:VIDEO    If the visit is dropped, the patient can be reconnected by: VIDEO VISIT: Text to cell phone:   Telephone Information:   Mobile [phone]       Will anyone else be joining the visit? Yes, Pt daughter Teresa joining [facility] through link [phone]   (If patient encounters technical issues they should call [phone])    How would you like to obtain your AVS? MyChart    Are changes needed to the allergy or medication list? No    Reason for visit: RECHECK (Rescheduled post op, gross hematuria )    Malgorzata Khan VVF

## 2024-02-06 NOTE — PROGRESS NOTES
Jared Rae is a 58 year old male who is being evaluated via a billable video visit.      How would you like to obtain your AVS? MyChart  If the video visit is dropped, the invitation should be resent by: Text to cell phone: [phone]  Will anyone else be joining your video visit? No    Video Start Time: 4:38 PM    CHIEF COMPLAINT   It was my pleasure to see Jared Rae who is a 58 year old male for follow-up of bladder cancer.      HPI   Jared Rae is a very pleasant 58 year old male who presents with a history of bladder cancer. Had TURBT and right ureteroscopy 1/10/2024. There were no suspicious lesions noted on direct visualization of right ureter on ureteroscopy. Bladder tumor resected and LG Ta disease. Has had some intermittent hematuria since surgery, last about 1 week ago. Urine now clear.    TURBT 1/10/2024  Final Diagnosis   A. Bladder Tumor:  - Low-grade papillary urothelial carcinoma, noninvasive  - No muscularis propria seen     CT Urogram 11/17/2023  IMPRESSION:  1.  Papillary filling defect involving the left posterior bladder lateral to the UVJ suspicious for possible urothelial neoplasm. Additional indeterminate anti-dependent excretory phase filling defect in the mid right ureter. Recommend further   investigation with cystoscopy/ureteroscopy.         Allergies:   Patient has no known allergies.         Review of Systems:  From intake questionnaire     Skin: negative  Eyes: negative  Ears/Nose/Throat: negative  Respiratory: No shortness of breath, dyspnea on exertion, cough, or hemoptysis  Cardiovascular: No chest pain or palpitations  Gastrointestinal: negative; no nausea/vomiting, constipation or diarrhea  Genitourinary: as per HPI  Musculoskeletal: negative  Neurologic: negative  Psychiatric: negative  Hematologic/Lymphatic/Immunologic: negative  Endocrine: negative         Physical Exam:     Vitals:  No vitals were obtained today due to virtual visit.    Physical Exam    EYES: Eyes grossly normal to inspection.  No discharge or erythema, or obvious scleral/conjunctival abnormalities.  SKIN: Visible skin clear. No significant rash, abnormal pigmentation or lesions.  NEURO: Cranial nerves grossly intact.  Mentation and speech appropriate for age.  GENERAL: Healthy, alert and no distress  RESP: No audible wheeze, cough, or visible cyanosis.  No visible retractions or increased work of breathing.    PSYCH: Mentation appears normal, affect normal/bright, judgement and insight intact, normal speech and appearance well-groomed.      Outside and Past Medical records:    Review of the result(s) of each unique test - pathology         Assessment and Plan:     58 year old male with LG Ta urothelial carcinoma of the bladder, TURBT 1/10/2024. No lesion noted on ureteroscopy of right ureter despite indeterminate lesion on CT. We discussed his diagnosis, the pathology, and role for ongoing surveillance.    Plan:  Follow-up 3 months with office cystoscopy    Video-Visit Details    Type of service:  Video Visit    Video End Time:4:49 PM    Originating Location (pt. Location): Home    Distant Location (provider location):  On-site    Platform used for Video Visit: autoGraph    20 minutes spent on the date of the encounter including direct interaction with the patient, performing chart review, history and exam, documentation and further activities as noted above.    Abdelrahman Oswald MD  Urology  [facility]

## 2024-02-07 ENCOUNTER — TELEPHONE (OUTPATIENT)
Dept: UROLOGY | Facility: CLINIC | Age: 59
End: 2024-02-07
Payer: COMMERCIAL

## 2024-02-07 PROBLEM — C67.2 MALIGNANT NEOPLASM OF LATERAL WALL OF URINARY BLADDER (H): Status: ACTIVE | Noted: 2024-02-07

## 2024-02-07 NOTE — TELEPHONE ENCOUNTER
Left detailed voice message in regards to checkout notes 2/6 to schedule 3 month cysto with Dr Oswald

## 2024-02-12 ENCOUNTER — OFFICE VISIT (OUTPATIENT)
Dept: INTERNAL MEDICINE | Facility: CLINIC | Age: 59
End: 2024-02-12
Payer: COMMERCIAL

## 2024-02-12 VITALS
BODY MASS INDEX: 28.74 KG/M2 | OXYGEN SATURATION: 98 % | TEMPERATURE: 97.3 F | RESPIRATION RATE: 16 BRPM | HEIGHT: 76 IN | SYSTOLIC BLOOD PRESSURE: 138 MMHG | HEART RATE: 61 BPM | WEIGHT: 236 LBS | DIASTOLIC BLOOD PRESSURE: 88 MMHG

## 2024-02-12 DIAGNOSIS — G47.33 OSA ON CPAP: ICD-10-CM

## 2024-02-12 DIAGNOSIS — Z12.5 SCREENING FOR PROSTATE CANCER: ICD-10-CM

## 2024-02-12 DIAGNOSIS — I10 ESSENTIAL HYPERTENSION: ICD-10-CM

## 2024-02-12 DIAGNOSIS — E78.2 MIXED HYPERLIPIDEMIA: ICD-10-CM

## 2024-02-12 DIAGNOSIS — C67.2 MALIGNANT NEOPLASM OF LATERAL WALL OF URINARY BLADDER (H): ICD-10-CM

## 2024-02-12 DIAGNOSIS — M1A.09X0 IDIOPATHIC CHRONIC GOUT OF MULTIPLE SITES WITHOUT TOPHUS: ICD-10-CM

## 2024-02-12 DIAGNOSIS — I25.10 CORONARY ARTERY DISEASE INVOLVING NATIVE CORONARY ARTERY OF NATIVE HEART WITHOUT ANGINA PECTORIS: ICD-10-CM

## 2024-02-12 DIAGNOSIS — N52.8 OTHER MALE ERECTILE DYSFUNCTION: ICD-10-CM

## 2024-02-12 DIAGNOSIS — K76.0 HEPATIC STEATOSIS: ICD-10-CM

## 2024-02-12 DIAGNOSIS — E11.9 TYPE 2 DIABETES MELLITUS WITHOUT COMPLICATION, WITHOUT LONG-TERM CURRENT USE OF INSULIN (H): ICD-10-CM

## 2024-02-12 DIAGNOSIS — Z00.00 ANNUAL PHYSICAL EXAM: Primary | ICD-10-CM

## 2024-02-12 DIAGNOSIS — L29.9 PRURITUS: ICD-10-CM

## 2024-02-12 PROBLEM — N32.89 BLADDER MASS: Status: RESOLVED | Noted: 2023-11-20 | Resolved: 2024-02-12

## 2024-02-12 PROBLEM — N28.89 URETERAL MASS: Status: RESOLVED | Noted: 2023-11-20 | Resolved: 2024-02-12

## 2024-02-12 PROBLEM — E78.00 PURE HYPERCHOLESTEROLEMIA: Status: RESOLVED | Noted: 2023-06-26 | Resolved: 2024-02-12

## 2024-02-12 PROCEDURE — 99214 OFFICE O/P EST MOD 30 MIN: CPT | Performed by: INTERNAL MEDICINE

## 2024-02-12 RX ORDER — LOSARTAN POTASSIUM 50 MG/1
50 TABLET ORAL DAILY
Qty: 90 TABLET | Refills: 4 | Status: SHIPPED | OUTPATIENT
Start: 2024-02-12

## 2024-02-12 ASSESSMENT — PAIN SCALES - GENERAL: PAINLEVEL: NO PAIN (0)

## 2024-02-12 NOTE — PROGRESS NOTES
Office Visit - Physical   Jared Rae   58 year old  male    Date of visit: 2/12/2024  Physician: Jacoby Jaquez MD     Assessment and Plan   1. Annual physical exam  This is a 58-year-old man with issues as discussed below    2. Screening for prostate cancer  - PSA, screen; Future    3. Malignant neoplasm of lateral wall of urinary bladder (H)  Following with urology, appears to be superficial will have repeat cystoscopy for recommendation    4. Coronary artery disease involving native coronary artery of native heart without angina pectoris  Continue with secondary prevention  - Lipid panel reflex to direct LDL Fasting; Future    5. Type 2 diabetes mellitus without complication, without long-term current use of insulin (H)  Will increase Rybelsus both for diabetes control and weight loss measures, discussed injectable form which he would consider  - HEMOGLOBIN A1C; Future  - Semaglutide (RYBELSUS) 14 MG tablet; Take 14 mg by mouth daily  Dispense: 90 tablet; Refill: 3    6. Pruritus  Possibly related to Rybelsus, continue antihistamine which is effective    7. Essential hypertension  Blood pressure is elevated, start losartan, follow-up labs in 1 week  - losartan (COZAAR) 50 MG tablet; Take 1 tablet (50 mg) by mouth daily  Dispense: 90 tablet; Refill: 4  - Comprehensive metabolic panel; Future  - CBC with platelets; Future    8. Other male erectile dysfunction  Continue with Cialis    9. SAM on CPAP    10. Mixed hyperlipidemia  Continue statin    11. Idiopathic chronic gout of multiple sites without tophus  Continue allopurinol    12. Hepatic steatosis  Reduction in calories, carbohydrates and modest weight loss  The following high BMI interventions were performed this visit: encouragement to exercise and lifestyle education regarding diet    Return in about 1 week (around 2/19/2024) for using a MyChart eVisit.     Chief Complaint   Chief Complaint   Patient presents with    Follow Up     Follow up for type  2 diabetes. Had blood in urine and had procedure done 1/10/24.        Patient Profile   Social History     Social History Narrative    , wife Lakeshia.  Three daughters, Lauryn, Teresa, Audrey.  Owns [employer].          Past Medical History   Patient Active Problem List   Diagnosis    Type 2 diabetes mellitus without complication, without long-term current use of insulin (H)    Other male erectile dysfunction    Idiopathic chronic gout of multiple sites without tophus    Hepatic steatosis    Mixed hyperlipidemia    Essential hypertension    SAM on CPAP    Pruritus    Coronary artery disease involving native coronary artery of native heart without angina pectoris    Malignant neoplasm of lateral wall of urinary bladder (H)       Past Surgical History  He has a past surgical history that includes Laparoscopic cholecystectomy (N/A, 11/1/2016); Anterior Cruciate Ligament Repair (Bilateral, he was 24 yrs and 46 years old); Inguinal Hernia Repair; Cystoscopy, Biopsy Bladder Instill Optical Agent (Right, 1/10/2024); and Combined Cystoscopy, Retrogrades, Ureteroscopy, Insert Stent (Right, 1/10/2024).     History of Present Illness   This 58 year old man comes in for annual visit and follow-up.  He is historically been followed at [facility] in the [facility] health clinic but it has been over a year since he has been seeing there.  Recently found to have a bladder cancer and is being followed by Dr. Veronica in the urology department.  He is doing well postprocedure.  He has gained a little bit of weight back after losing 25 pounds.  He still about 10 pounds less than his starting weight.  He is on Rybelsus and tolerating.  He does get some itchy skin with this which responds well to antihistamine.  He has noticed some elevated blood pressures.  He is on Cialis which has been helpful.    Review of Systems: A comprehensive review of systems was negative except as noted.     Medications and Allergies    Current Outpatient Medications   Medication Sig Dispense Refill    allopurinol (ZYLOPRIM) 100 MG tablet Take 1 tablet (100 mg) by mouth daily (Patient taking differently: Take 100 mg by mouth every morning) 90 tablet 4    aspirin 81 MG EC tablet [ASPIRIN 81 MG EC TABLET] Take 1 tablet (81 mg total) by mouth daily. (Patient taking differently: Take 81 mg by mouth every morning) 90 tablet 3    cetirizine (ZYRTEC) 10 MG tablet Take 10 mg by mouth every 48 hours      indomethacin (INDOCIN) 50 MG capsule Take 1 capsule (50 mg) by mouth 2 times daily (with meals) (Patient taking differently: Take 50 mg by mouth 2 times daily as needed) 30 capsule 1    losartan (COZAAR) 50 MG tablet Take 1 tablet (50 mg) by mouth daily 90 tablet 4    multivit-min/ferrous fumarate (MULTI VITAMIN ORAL) Take 1 tablet by mouth every morning      omega-3/dha/epa/fish oil (FISH OIL-OMEGA-3 FATTY ACIDS) 300-1,000 mg capsule Take 2 g by mouth every morning      rosuvastatin (CRESTOR) 20 MG tablet Take 1 tablet (20 mg) by mouth daily (Patient taking differently: Take 20 mg by mouth every morning) 90 tablet 4    Semaglutide (RYBELSUS) 14 MG tablet Take 14 mg by mouth daily 90 tablet 3    Semaglutide (RYBELSUS) 7 MG tablet Take 1 tablet (7 mg) by mouth daily (Patient taking differently: Take 7 mg by mouth every morning) 90 tablet 4    tadalafil (CIALIS) 5 MG tablet Take 1 tablet (5 mg) by mouth daily (Patient taking differently: Take 5 mg by mouth every morning) 90 tablet 4     No Known Allergies     Family and Social History   Family History   Problem Relation Age of Onset    No Known Problems Mother     Coronary Artery Disease Father     Congenital heart disease Sister     Lupus Sister     Coronary Artery Disease Brother     Deep Vein Thrombosis (DVT) Brother     Coronary Artery Disease Brother     Coronary Artery Disease Brother     CABG Brother     No Known Problems Brother     No Known Problems Brother     No Known Problems Daughter     No  "Known Problems Daughter     No Known Problems Daughter     Anesthesia Reaction No family hx of         Social History     Tobacco Use    Smoking status: Never    Smokeless tobacco: Never   Vaping Use    Vaping Use: Never used   Substance Use Topics    Alcohol use: Yes     Alcohol/week: 10.0 standard drinks of alcohol    Drug use: No        Physical Exam   General Appearance:   No acute distress    /88   Pulse 61   Temp 97.3  F (36.3  C) (Tympanic)   Resp 16   Ht 1.93 m (6' 4\")   Wt 107 kg (236 lb)   SpO2 98%   BMI 28.73 kg/m      EYES: Eyelids, conjunctiva, and sclera were normal. Pupils were normal. Cornea, iris, and lens were normal bilaterally.  HEAD, EARS, NOSE, MOUTH, AND THROAT: Head and face were normal. Hearing was normal to voice and the ears were normal to external exam. Nose appearance was normal and there was no discharge.   NECK: Neck appearance was normal.   RESPIRATORY: Breathing pattern was normal and the chest moved symmetrically.  Percussion/auscultatory percussion was normal.  Lung sounds were normal and there were no abnormal sounds.  CARDIOVASCULAR: Heart rate and rhythm were normal.  S1 and S2 were normal and there were no extra sounds or murmurs. Peripheral pulses in arms and legs were normal.  Jugular venous pressure was normal.  There was no peripheral edema.  GASTROINTESTINAL: The abdomen was normal in contour.   NEUROLOGIC: The patient was alert and oriented to person, place, time, and circumstance. Speech was normal. Cranial nerves were normal. Motor strength was normal for age. The patient was normally coordinated.  PSYCHIATRIC:  Mood and affect were normal and the patient had normal recent and remote memory. The patient's judgment and insight were normal.       Additional Information        Jacoby Jaquez MD  Internal Medicine  Contact me at [phone]  Answers submitted by the patient for this visit:  General Questionnaire (Submitted on 2/5/2024)  Chief Complaint: " Chronic problems general questions HPI Form  What is the reason for your visit today? : Annual Check-up  How many servings of fruits and vegetables do you eat daily?: 0-1  On average, how many sweetened beverages do you drink each day (Examples: soda, juice, sweet tea, etc.  Do NOT count diet or artificially sweetened beverages)?: 1  How many minutes a day do you exercise enough to make your heart beat faster?: 60 or more  How many days a week do you exercise enough to make your heart beat faster?: 5  How many days per week do you miss taking your medication?: 0

## 2024-03-16 ENCOUNTER — HEALTH MAINTENANCE LETTER (OUTPATIENT)
Age: 59
End: 2024-03-16

## 2024-03-25 ENCOUNTER — IMMUNIZATION (OUTPATIENT)
Dept: FAMILY MEDICINE | Facility: CLINIC | Age: 59
End: 2024-03-25
Payer: COMMERCIAL

## 2024-03-25 DIAGNOSIS — Z23 ENCOUNTER FOR IMMUNIZATION: Primary | ICD-10-CM

## 2024-03-25 DIAGNOSIS — I25.10 CORONARY ARTERY DISEASE INVOLVING NATIVE CORONARY ARTERY OF NATIVE HEART WITHOUT ANGINA PECTORIS: ICD-10-CM

## 2024-03-25 DIAGNOSIS — Z12.5 SCREENING FOR PROSTATE CANCER: ICD-10-CM

## 2024-03-25 DIAGNOSIS — E11.9 TYPE 2 DIABETES MELLITUS WITHOUT COMPLICATION, WITHOUT LONG-TERM CURRENT USE OF INSULIN (H): ICD-10-CM

## 2024-03-25 DIAGNOSIS — I10 ESSENTIAL HYPERTENSION: ICD-10-CM

## 2024-03-25 LAB
ALBUMIN SERPL BCG-MCNC: 4.9 G/DL (ref 3.5–5.2)
ALP SERPL-CCNC: 56 U/L (ref 40–150)
ALT SERPL W P-5'-P-CCNC: 58 U/L (ref 0–70)
ANION GAP SERPL CALCULATED.3IONS-SCNC: 10 MMOL/L (ref 7–15)
AST SERPL W P-5'-P-CCNC: 44 U/L (ref 0–45)
BILIRUB SERPL-MCNC: 0.6 MG/DL
BUN SERPL-MCNC: 25 MG/DL (ref 8–23)
CALCIUM SERPL-MCNC: 9.9 MG/DL (ref 8.6–10)
CHLORIDE SERPL-SCNC: 104 MMOL/L (ref 98–107)
CHOLEST SERPL-MCNC: 129 MG/DL
CREAT SERPL-MCNC: 0.87 MG/DL (ref 0.67–1.17)
DEPRECATED HCO3 PLAS-SCNC: 26 MMOL/L (ref 22–29)
EGFRCR SERPLBLD CKD-EPI 2021: >90 ML/MIN/1.73M2
ERYTHROCYTE [DISTWIDTH] IN BLOOD BY AUTOMATED COUNT: 11.8 % (ref 10–15)
FASTING STATUS PATIENT QL REPORTED: NORMAL
GLUCOSE SERPL-MCNC: 99 MG/DL (ref 70–99)
HBA1C MFR BLD: 5.5 % (ref 0–5.6)
HCT VFR BLD AUTO: 44.8 % (ref 40–53)
HDLC SERPL-MCNC: 49 MG/DL
HGB BLD-MCNC: 15 G/DL (ref 13.3–17.7)
LDLC SERPL CALC-MCNC: 59 MG/DL
MCH RBC QN AUTO: 30.1 PG (ref 26.5–33)
MCHC RBC AUTO-ENTMCNC: 33.5 G/DL (ref 31.5–36.5)
MCV RBC AUTO: 90 FL (ref 78–100)
NONHDLC SERPL-MCNC: 80 MG/DL
PLATELET # BLD AUTO: 182 10E3/UL (ref 150–450)
POTASSIUM SERPL-SCNC: 4.3 MMOL/L (ref 3.4–5.3)
PROT SERPL-MCNC: 7.7 G/DL (ref 6.4–8.3)
PSA SERPL DL<=0.01 NG/ML-MCNC: 1.3 NG/ML (ref 0–3.5)
RBC # BLD AUTO: 4.99 10E6/UL (ref 4.4–5.9)
SODIUM SERPL-SCNC: 140 MMOL/L (ref 135–145)
TRIGL SERPL-MCNC: 107 MG/DL
WBC # BLD AUTO: 5.1 10E3/UL (ref 4–11)

## 2024-03-25 PROCEDURE — 83036 HEMOGLOBIN GLYCOSYLATED A1C: CPT

## 2024-03-25 PROCEDURE — G0103 PSA SCREENING: HCPCS

## 2024-03-25 PROCEDURE — 90471 IMMUNIZATION ADMIN: CPT

## 2024-03-25 PROCEDURE — 80061 LIPID PANEL: CPT

## 2024-03-25 PROCEDURE — 90686 IIV4 VACC NO PRSV 0.5 ML IM: CPT

## 2024-03-25 PROCEDURE — 80053 COMPREHEN METABOLIC PANEL: CPT

## 2024-03-25 PROCEDURE — 36415 COLL VENOUS BLD VENIPUNCTURE: CPT

## 2024-03-25 PROCEDURE — 99207 PR NO CHARGE NURSE ONLY: CPT

## 2024-03-25 PROCEDURE — 85027 COMPLETE CBC AUTOMATED: CPT

## 2024-03-25 NOTE — PROGRESS NOTES
Prior to immunization administration, verified patients identity using patient s name and date of birth. Please see Immunization Activity for additional information.     Screening Questionnaire for Adult Immunization    Are you sick today?   No   Do you have allergies to medications, food, a vaccine component or latex?   Yes   Have you ever had a serious reaction after receiving a vaccination?   Yes   Do you have a long-term health problem with heart, lung, kidney, or metabolic disease (e.g., diabetes), asthma, a blood disorder, no spleen, complement component deficiency, a cochlear implant, or a spinal fluid leak?  Are you on long-term aspirin therapy?   No   Do you have cancer, leukemia, HIV/AIDS, or any other immune system problem?   No   Do you have a parent, brother, or sister with an immune system problem?   No   In the past 3 months, have you taken medications that affect  your immune system, such as prednisone, other steroids, or anticancer drugs; drugs for the treatment of rheumatoid arthritis, Crohn s disease, or psoriasis; or have you had radiation treatments?   No   Have you had a seizure, or a brain or other nervous system problem?   No   During the past year, have you received a transfusion of blood or blood    products, or been given immune (gamma) globulin or antiviral drug?   No   For women: Are you pregnant or is there a chance you could become       pregnant during the next month?   No   Have you received any vaccinations in the past 4 weeks?   No     Immunization questionnaire was positive for at least one answer.  Diabetic Notified Dr. Yen.    I have reviewed the following standing orders:   This patient is due and qualifies for the Covid-19 vaccine.     Click here for COVID-19 Standing Order    I have reviewed the vaccines inclusion and exclusion criteria; There were concerns regarding the following exclusions, Diabetic. I have brought them to a provider who approved vaccination.    This  patient is due and qualifies for the Influenza vaccine.    Click here for Influenza Vaccine Standing Order    I have reviewed the vaccines inclusion and exclusion criteria; There were concerns regarding the following exclusions, Diabetic. I have brought them to a provider who approved vaccination.    Patient instructed to remain in clinic for 15 minutes afterwards, and to report any adverse reactions.     Screening performed by Modesta Goodwin MA on 3/25/2024 at 11:39 AM.    Prior to immunization administration, verified patients identity using patient s name and date of birth. Please see Immunization Activity for additional information.     Screening Questionnaire for Adult Immunization    Are you sick today?   {:946372}   Do you have allergies to medications, food, a vaccine component or latex?   {:845271}   Have you ever had a serious reaction after receiving a vaccination?   {:222774}   Do you have a long-term health problem with heart, lung, kidney, or metabolic disease (e.g., diabetes), asthma, a blood disorder, no spleen, complement component deficiency, a cochlear implant, or a spinal fluid leak?  Are you on long-term aspirin therapy?   {:494738}   Do you have cancer, leukemia, HIV/AIDS, or any other immune system problem?   {:227478}   Do you have a parent, brother, or sister with an immune system problem?   {:768578}   In the past 3 months, have you taken medications that affect  your immune system, such as prednisone, other steroids, or anticancer drugs; drugs for the treatment of rheumatoid arthritis, Crohn s disease, or psoriasis; or have you had radiation treatments?   {:813080}   Have you had a seizure, or a brain or other nervous system problem?   {:277697}   During the past year, have you received a transfusion of blood or blood    products, or been given immune (gamma) globulin or antiviral drug?   {:644625}   For women: Are you pregnant or is there a chance you could become       pregnant during  "the next month?   {:260837}   Have you received any vaccinations in the past 4 weeks?   {:047382}     Immunization questionnaire { :353131::\"answers were all negative.\"}    I have reviewed the following standing orders: {Adult Vaccine Standing Order:970804}    Patient instructed to remain in clinic for 15 minutes afterwards, and to report any adverse reactions.     Screening performed by Modesta Goodwin MA on 3/25/2024 at 11:52 AM.    "

## 2024-03-25 NOTE — PROGRESS NOTES
Prior to immunization administration, verified patients identity using patient s name and date of birth. Please see Immunization Activity for additional information.     Screening Questionnaire for Adult Immunization    Are you sick today?   No   Do you have allergies to medications, food, a vaccine component or latex?   No   Have you ever had a serious reaction after receiving a vaccination?   No   Do you have a long-term health problem with heart, lung, kidney, or metabolic disease (e.g., diabetes), asthma, a blood disorder, no spleen, complement component deficiency, a cochlear implant, or a spinal fluid leak?  Are you on long-term aspirin therapy?   Yes   Do you have cancer, leukemia, HIV/AIDS, or any other immune system problem?   No   Do you have a parent, brother, or sister with an immune system problem?   No   In the past 3 months, have you taken medications that affect  your immune system, such as prednisone, other steroids, or anticancer drugs; drugs for the treatment of rheumatoid arthritis, Crohn s disease, or psoriasis; or have you had radiation treatments?   No   Have you had a seizure, or a brain or other nervous system problem?   No   During the past year, have you received a transfusion of blood or blood    products, or been given immune (gamma) globulin or antiviral drug?   No   For women: Are you pregnant or is there a chance you could become       pregnant during the next month?   No   Have you received any vaccinations in the past 4 weeks?   No     Immunization questionnaire was positive for at least one answer.  Notified Dr. Yen.    I have reviewed the following standing orders:   This patient is due and qualifies for the Influenza vaccine.    Click here for Influenza Vaccine Standing Order    I have reviewed the vaccines inclusion and exclusion criteria; There were concerns regarding the following exclusions, diabetic. I have brought them to a provider who approved vaccination.    Patient  instructed to remain in clinic for 15 minutes afterwards, and to report any adverse reactions.     Screening performed by Modesta Goodwin MA on 3/25/2024 at 11:57 AM.

## 2024-04-04 DIAGNOSIS — M1A.09X0 IDIOPATHIC CHRONIC GOUT OF MULTIPLE SITES WITHOUT TOPHUS: ICD-10-CM

## 2024-04-04 RX ORDER — ALLOPURINOL 100 MG/1
100 TABLET ORAL DAILY
Qty: 90 TABLET | Refills: 4 | OUTPATIENT
Start: 2024-04-04

## 2024-05-31 ENCOUNTER — TELEPHONE (OUTPATIENT)
Dept: INTERNAL MEDICINE | Facility: CLINIC | Age: 59
End: 2024-05-31
Payer: COMMERCIAL

## 2024-05-31 NOTE — TELEPHONE ENCOUNTER
Central Prior Authorization Team   Phone: [phone]    PA Initiation    Medication: Rybelsus 14MG tablets  Insurance Company: [employer] - Phone [phone] Fax [phone]  Pharmacy Filling the Rx: [facility] #[id number] [place], [place] - [address] AT [facility] OF [address]  Filling Pharmacy Phone: [phone]  Filling Pharmacy Fax:    Start Date: 5/31/2024

## 2024-05-31 NOTE — TELEPHONE ENCOUNTER
Semaglutide (RYBELSUS) 14 MG tablet 90 tablet 3 2/12/2024 -- --  Sig - Route: Take 14 mg by mouth daily - Oral  Sent to pharmacy as: Semaglutide 14 MG Oral Tablet (RYBELSUS)  Class: E-Prescribe  Order: [id number]  E-Prescribing Status: Receipt confirmed by pharmacy (2/12/2024  1:15 PM CST)    Printout Tracking    External Result Report    Pharmacy    [id number] - [place], [place] - [facility] [address] AT [facility] [address] [employer]    Associated Diagnoses    Type 2 diabetes mellitus without complication, without long-term current use of insulin (H) [E11.9]      Source Order Set    Order Set Name Order ID   [id number]    Prescribing Provider's NPI: [id number]  Jacoby Jaquez    Pt is out of medication

## 2024-05-31 NOTE — TELEPHONE ENCOUNTER
Prior Authorization Approval    Authorization Effective Date: 5/31/2024  Authorization Expiration Date: 5/31/2027  Medication: Rybelsus 14MG tablets  Reference #:     Insurance Company: [employer] - Phone [phone] Fax [phone]  Which Pharmacy is filling the prescription (Not needed for infusion/clinic administered): [facility] #[id number] - [place] [address] AT [address] [facility] [address] [facility]  Pharmacy Notified: Yes  Patient Notified: Instructed pharmacy to notify patient when script is ready to /ship.

## 2024-06-06 ENCOUNTER — TELEPHONE (OUTPATIENT)
Dept: INTERNAL MEDICINE | Facility: CLINIC | Age: 59
End: 2024-06-06
Payer: COMMERCIAL

## 2024-06-06 DIAGNOSIS — E11.9 TYPE 2 DIABETES MELLITUS WITHOUT COMPLICATION, WITHOUT LONG-TERM CURRENT USE OF INSULIN (H): ICD-10-CM

## 2024-06-06 NOTE — TELEPHONE ENCOUNTER
Medication Question or Refill    Contacts         Type Contact Phone/Fax    06/06/2024 09:36 AM CDT Phone (Incoming) Jared Rae (Self)      Pharmacy said they do not have the Rx, needs to be re-filled, was to have been signed last week, [place] in [facility]            What medication are you calling about (include dose and sig)?: Rybelsus 14mg    Preferred Pharmacy:   [email or web address] #[id number] - [place], [place] - [address] AT [address] OF [employer]  [address]  [facility] [address]  Phone: [phone] Fax: [phone]    [facility][id number] - [place] - [address]  [address] [place] [address]  Phone: [phone] Fax: [phone]    [facility] [address] [facility] [address]  [address] [place] [address] [address]  [facility] [address]  Phone: [phone] Fax: [phone] Alternate Fax: [phone], [phone]      Controlled Substance Agreement on file:   CSA -- Patient Level:    CSA: None found at the patient level.       Who prescribed the medication?: Dr Jaquez    Do you need a refill? Yes    When did you use the medication last? Tuesday June 4th    Patient offered an appointment? No    Do you have any questions or concerns?  No      Could we send this information to you in [facility] or would you prefer to receive a phone call?:   Patient would prefer a phone call   Okay to leave a detailed message?: Yes at Cell number on file:    Telephone Information:   Mobile [phone]

## 2024-06-12 NOTE — TELEPHONE ENCOUNTER
Spoke with pharmacy who states they do have the prescription on file and the patient last filled this on 6/7. No further action needed

## 2024-07-04 ENCOUNTER — TELEPHONE (OUTPATIENT)
Dept: INTERNAL MEDICINE | Facility: CLINIC | Age: 59
End: 2024-07-04
Payer: COMMERCIAL

## 2024-07-04 NOTE — TELEPHONE ENCOUNTER
Medication Question or Refill        What medication are you calling about (include dose and sig)?: Rydelsus 14 mg 30 tab    Preferred Pharmacy:   [facility] #[id number] - [place], [place] - [address] AT [address]  [address]  [facility] [address]  Phone: [phone] Fax: [phone]      Controlled Substance Agreement on file:   CSA -- Patient Level:    CSA: None found at the patient level.       Who prescribed the medication?: Jacoby Jaquez    Do you need a refill? Yes    When did you use the medication last? Today (pt has 2 more days worth)    Patient offered an appointment? No    Do you have any questions or concerns?  Yes, need renewed rx to pharmacy. Per pharmacy no refills      Could we send this information to you in [facility] or would you prefer to receive a phone call?:   Patient would prefer a phone call   Okay to leave a detailed message?: Yes at Cell number on file:    Telephone Information:   Mobile [phone]

## 2024-07-05 NOTE — TELEPHONE ENCOUNTER
Called pt to advise that he should have one more refill on file with pharmacy - pt was at pharmacy when writer called and advised writer that he was able to  medication - advised he can request new scripts  14 days before needing a refill - no further questions

## 2024-07-15 DIAGNOSIS — M1A.09X0 IDIOPATHIC CHRONIC GOUT OF MULTIPLE SITES WITHOUT TOPHUS: ICD-10-CM

## 2024-07-16 DIAGNOSIS — E78.2 MIXED HYPERLIPIDEMIA: ICD-10-CM

## 2024-07-16 RX ORDER — ALLOPURINOL 100 MG/1
100 TABLET ORAL DAILY
Qty: 90 TABLET | Refills: 4 | Status: SHIPPED | OUTPATIENT
Start: 2024-07-16

## 2024-07-16 RX ORDER — ROSUVASTATIN CALCIUM 20 MG/1
20 TABLET, COATED ORAL DAILY
Qty: 90 TABLET | Refills: 4 | Status: SHIPPED | OUTPATIENT
Start: 2024-07-16

## 2024-08-01 ENCOUNTER — OFFICE VISIT (OUTPATIENT)
Dept: UROLOGY | Facility: CLINIC | Age: 59
End: 2024-08-01
Payer: COMMERCIAL

## 2024-08-01 VITALS
HEIGHT: 76 IN | HEART RATE: 78 BPM | SYSTOLIC BLOOD PRESSURE: 149 MMHG | OXYGEN SATURATION: 98 % | WEIGHT: 235 LBS | DIASTOLIC BLOOD PRESSURE: 84 MMHG | BODY MASS INDEX: 28.62 KG/M2

## 2024-08-01 DIAGNOSIS — C67.2 MALIGNANT NEOPLASM OF LATERAL WALL OF URINARY BLADDER (H): Primary | ICD-10-CM

## 2024-08-01 LAB
ALBUMIN UR-MCNC: NEGATIVE MG/DL
APPEARANCE UR: CLEAR
BILIRUB UR QL STRIP: NEGATIVE
COLOR UR AUTO: YELLOW
GLUCOSE UR STRIP-MCNC: NEGATIVE MG/DL
HGB UR QL STRIP: NEGATIVE
KETONES UR STRIP-MCNC: NEGATIVE MG/DL
LEUKOCYTE ESTERASE UR QL STRIP: NEGATIVE
NITRATE UR QL: NEGATIVE
PH UR STRIP: 5.5 [PH] (ref 5–7)
SP GR UR STRIP: 1.02 (ref 1–1.03)
UROBILINOGEN UR STRIP-ACNC: 0.2 E.U./DL

## 2024-08-01 PROCEDURE — 81003 URINALYSIS AUTO W/O SCOPE: CPT | Mod: QW | Performed by: UROLOGY

## 2024-08-01 PROCEDURE — 88112 CYTOPATH CELL ENHANCE TECH: CPT | Performed by: PATHOLOGY

## 2024-08-01 PROCEDURE — 52000 CYSTOURETHROSCOPY: CPT | Performed by: UROLOGY

## 2024-08-01 RX ORDER — LIDOCAINE HYDROCHLORIDE 20 MG/ML
JELLY TOPICAL ONCE
Status: COMPLETED | OUTPATIENT
Start: 2024-08-01 | End: 2024-08-01

## 2024-08-01 RX ADMIN — LIDOCAINE HYDROCHLORIDE 5 ML: 20 JELLY TOPICAL at 08:28

## 2024-08-01 ASSESSMENT — PAIN SCALES - GENERAL: PAINLEVEL: NO PAIN (0)

## 2024-08-01 NOTE — PROGRESS NOTES
"  CHIEF COMPLAINT   It was my pleasure to see Jared Rae who is a 58 year old male for follow-up of bladder cancer.      HPI  Jared Rae is a very pleasant 58 year old male who presents with a history of bladder cancer. Had TURBT and right ureteroscopy 1/10/2024. There were no suspicious lesions noted on direct visualization of right ureter on ureteroscopy. Bladder tumor resected and LG Ta disease.     Bladder Cancer History  1/10/2024 - TURBT - LG Ta  8/1/2024 - cysto neg; cytol pending     TURBT 1/10/2024  Final Diagnosis   A. Bladder Tumor:  - Low-grade papillary urothelial carcinoma, noninvasive  - No muscularis propria seen      CT Urogram 11/17/2023  IMPRESSION:  1.  Papillary filling defect involving the left posterior bladder lateral to the UVJ suspicious for possible urothelial neoplasm. Additional indeterminate anti-dependent excretory phase filling defect in the mid right ureter. Recommend further   investigation with cystoscopy/ureteroscopy.    PHYSICAL EXAM  Patient is a 58 year old  male   Vitals: Blood pressure (!) 149/84, pulse 78, height 1.93 m (6' 4\"), weight 106.6 kg (235 lb), SpO2 98%.  General Appearance Adult: Body mass index is 28.61 kg/m .  Alert, no acute distress, oriented  Lungs: no respiratory distress, or pursed lip breathing  Abdomen: soft, nontender, no organomegaly or masses  Back: no CVAT  Neuro: Alert, oriented, speech and mentation normal  Psych: affect and mood normal  : penis, scrotum, testes normal    OFFICE CYSTOSCOPY 8/1/2024    Pre-procedure diagnosis:  Bladder Cancer  Post-procedure diagnosis: Normal Cystoscopy  Procedure performed:  Cystourethroscopy  Surgeon:    Abdelrahman Oswald MD  Anesthesia:    Local    Description of procedure:   After fully informed, voluntary consent was obtained, the patient was brought into the procedure room, identified and placed in a supine position on the cystoscopy table.  The groin/scrotum were prepped with betadine and draped in " a sterile fashion.  Urojet lidocaine gel was introduced.  A 15F flexible cystoscope was inserted into the urethra, and the bladder and urethra were examined in a systematic manner.  The patient tolerated the procedure well and there were no complications.      Cystoscopic findings:  The urethra was normal without strictures.  The prostate was 4cm long and demonstrated moderate bilobar hypertrophy.  There was a large median lobe.  The external sphincter coapted well and the bladder neck was open. The bladder was completely surveyed.  There was mild trabeculation.  There were no neoplasms, stones, or diverticula identifed.  The ureteric orifices were normal in position and number and effluxing clear urine. Prior resection site healthy with no suspicious findings today.    ASSESSMENT and PLAN  58 year old male with LG Ta urothelial carcinoma of the bladder, TURBT 1/10/2024. No lesion noted on ureteroscopy of right ureter despite indeterminate lesion on CT. No recurrence noted today.     Plan:  Cytology today  Follow-up 6 months with office cystoscopy    Abdelrahman Oswald MD  Urology  [facility]

## 2024-08-01 NOTE — Clinical Note
8/1/2024       RE: Jared Rae  [address] [place] [id number]     Dear Colleague,    Thank you for referring your patient, Jared Rae, to the [facility] [place] [facility]. Please see a copy of my visit note below.    No notes on file    Again, thank you for allowing me to participate in the care of your patient.      Sincerely,    Abdelrahman Oswald MD

## 2024-08-01 NOTE — PATIENT INSTRUCTIONS
"AFTER YOUR CYSTOSCOPY  ?  ?  You have just completed a cystoscopy, or \"cysto\", which allowed your physician to learn more about your bladder (or to remove a stent placed after surgery). We suggest that you continue to avoid caffeine, fruit juice, and alcohol for the next 24 hours, however, you are encouraged to return to your normal activities.  ?  ?  A few things that are considered normal after your cystoscopy:  ?  * small amount of bleeding (or spotting) that clears within the next 24 hours  ?  * slight burning sensation with urination  ?  * sensation of needing to void (urinate) more frequently  ?  * the feeling of \"air\" in your urine  ?  * mild discomfort that is relieved with Tylenol    * bladder spasms  ?  ?  ?  Please contact our office promptly if you:  ?  * develop a fever above 101 degrees  ?  * are unable to urinate  ?  * develop bright red blood that does not stop  ?  * experience severe pain or swelling  ?  ?  ?  And of course, please contact our office with any concerns or questions [phone].  ?   AFTER YOUR CYSTOSCOPY        You have just completed a cystoscopy, or \"cysto\", which allowed your physician to learn more about your bladder (or to remove a stent placed after surgery). We suggest that you continue to avoid caffeine, fruit juice, and alcohol for the next 24 hours, however, you are encouraged to return to your normal activities.         A few things that are considered normal after your cystoscopy:     * Small amount of bleeding (or spotting) that clears within the next 24 hours     * Slight burning sensation with urination     * Sensation to of needing to avoid more frequently     * The feeling of \"air\" in your urine     * Mild discomfort that is relieved with Tylenol        Please contact our office promptly if you:     * Develop a fever above 101 degrees     * Are unable to urinate     * Develop bright red blood that does not stop     * Severe pain or swelling         Please contact " our office with any concerns or questions @[facility].

## 2024-08-02 LAB
PATH REPORT.COMMENTS IMP SPEC: NORMAL
PATH REPORT.FINAL DX SPEC: NORMAL
PATH REPORT.GROSS SPEC: NORMAL
PATH REPORT.MICROSCOPIC SPEC OTHER STN: NORMAL

## 2024-08-03 ENCOUNTER — HEALTH MAINTENANCE LETTER (OUTPATIENT)
Age: 59
End: 2024-08-03

## 2024-08-06 ENCOUNTER — TELEPHONE (OUTPATIENT)
Dept: UROLOGY | Facility: CLINIC | Age: 59
End: 2024-08-06
Payer: COMMERCIAL

## 2024-08-06 NOTE — TELEPHONE ENCOUNTER
----- Message from Loreta WETZEL sent at 2024 11:36 AM CDT -----  Regardin month cysto  Return in about 6 months (around 2025).   Check out comments: Office cystoscopy    DALE  24

## 2024-08-31 DIAGNOSIS — M1A.09X0 IDIOPATHIC CHRONIC GOUT OF MULTIPLE SITES WITHOUT TOPHUS: ICD-10-CM

## 2024-09-03 RX ORDER — INDOMETHACIN 50 MG/1
50 CAPSULE ORAL 2 TIMES DAILY PRN
Qty: 60 CAPSULE | Refills: 1 | Status: SHIPPED | OUTPATIENT
Start: 2024-09-03

## 2024-12-21 ENCOUNTER — HEALTH MAINTENANCE LETTER (OUTPATIENT)
Age: 59
End: 2024-12-21

## 2025-02-17 DIAGNOSIS — E11.9 TYPE 2 DIABETES MELLITUS WITHOUT COMPLICATION, WITHOUT LONG-TERM CURRENT USE OF INSULIN (H): ICD-10-CM

## 2025-02-17 DIAGNOSIS — I10 ESSENTIAL HYPERTENSION: ICD-10-CM

## 2025-02-18 DIAGNOSIS — I10 ESSENTIAL HYPERTENSION: ICD-10-CM

## 2025-02-18 DIAGNOSIS — E78.2 MIXED HYPERLIPIDEMIA: ICD-10-CM

## 2025-02-18 DIAGNOSIS — N52.8 OTHER MALE ERECTILE DYSFUNCTION: ICD-10-CM

## 2025-02-18 RX ORDER — ROSUVASTATIN CALCIUM 20 MG/1
20 TABLET, COATED ORAL DAILY
Qty: 90 TABLET | Refills: 4 | Status: SHIPPED | OUTPATIENT
Start: 2025-02-18

## 2025-02-18 RX ORDER — TADALAFIL 5 MG/1
5 TABLET ORAL DAILY
Qty: 90 TABLET | Refills: 4 | Status: SHIPPED | OUTPATIENT
Start: 2025-02-18

## 2025-02-18 RX ORDER — LOSARTAN POTASSIUM 50 MG/1
50 TABLET ORAL DAILY
Qty: 90 TABLET | Refills: 4 | Status: SHIPPED | OUTPATIENT
Start: 2025-02-18

## 2025-02-18 RX ORDER — LOSARTAN POTASSIUM 50 MG/1
50 TABLET ORAL DAILY
Qty: 90 TABLET | Refills: 4 | Status: CANCELLED | OUTPATIENT
Start: 2025-02-18

## 2025-02-20 ENCOUNTER — OFFICE VISIT (OUTPATIENT)
Dept: UROLOGY | Facility: CLINIC | Age: 60
End: 2025-02-20
Payer: COMMERCIAL

## 2025-02-20 VITALS
OXYGEN SATURATION: 98 % | DIASTOLIC BLOOD PRESSURE: 88 MMHG | HEART RATE: 82 BPM | HEIGHT: 76 IN | SYSTOLIC BLOOD PRESSURE: 136 MMHG | WEIGHT: 235 LBS | BODY MASS INDEX: 28.62 KG/M2

## 2025-02-20 DIAGNOSIS — C67.2 MALIGNANT NEOPLASM OF LATERAL WALL OF URINARY BLADDER (H): Primary | ICD-10-CM

## 2025-02-20 LAB
ALBUMIN UR-MCNC: ABNORMAL MG/DL
APPEARANCE UR: CLEAR
BILIRUB UR QL STRIP: NEGATIVE
COLOR UR AUTO: YELLOW
GLUCOSE UR STRIP-MCNC: NEGATIVE MG/DL
HGB UR QL STRIP: NEGATIVE
KETONES UR STRIP-MCNC: NEGATIVE MG/DL
LEUKOCYTE ESTERASE UR QL STRIP: NEGATIVE
NITRATE UR QL: NEGATIVE
PH UR STRIP: 6 [PH] (ref 5–7)
SP GR UR STRIP: >=1.03 (ref 1–1.03)
UROBILINOGEN UR STRIP-ACNC: 0.2 E.U./DL

## 2025-02-20 RX ORDER — LIDOCAINE HYDROCHLORIDE 20 MG/ML
JELLY TOPICAL ONCE
Status: COMPLETED | OUTPATIENT
Start: 2025-02-20 | End: 2025-02-20

## 2025-02-20 RX ADMIN — LIDOCAINE HYDROCHLORIDE 5 ML: 20 JELLY TOPICAL at 09:28

## 2025-02-20 ASSESSMENT — PAIN SCALES - GENERAL: PAINLEVEL_OUTOF10: NO PAIN (0)

## 2025-02-20 NOTE — Clinical Note
2/20/2025       RE: Jared Rae  [address] [place] [id number]     Dear Colleague,    Thank you for referring your patient, Jared Rae, to the [facility] [place] [facility]. Please see a copy of my visit note below.    No notes on file    Again, thank you for allowing me to participate in the care of your patient.      Sincerely,    Abdelrahman Oswald MD

## 2025-02-20 NOTE — PATIENT INSTRUCTIONS
"AFTER YOUR CYSTOSCOPY  ?  ?  You have just completed a cystoscopy, or \"cysto\", which allowed your physician to learn more about your bladder (or to remove a stent placed after surgery). We suggest that you continue to avoid caffeine, fruit juice, and alcohol for the next 24 hours, however, you are encouraged to return to your normal activities.  ?  ?  A few things that are considered normal after your cystoscopy:  ?  * small amount of bleeding (or spotting) that clears within the next 24 hours  ?  * slight burning sensation with urination  ?  * sensation of needing to void (urinate) more frequently  ?  * the feeling of \"air\" in your urine  ?  * mild discomfort that is relieved with Tylenol    * bladder spasms  ?  ?  ?  Please contact our office promptly if you:  ?  * develop a fever above 101 degrees  ?  * are unable to urinate  ?  * develop bright red blood that does not stop  ?  * experience severe pain or swelling  ?  ?  ?  And of course, please contact our office with any concerns or questions [phone].  ?   AFTER YOUR CYSTOSCOPY        You have just completed a cystoscopy, or \"cysto\", which allowed your physician to learn more about your bladder (or to remove a stent placed after surgery). We suggest that you continue to avoid caffeine, fruit juice, and alcohol for the next 24 hours, however, you are encouraged to return to your normal activities.         A few things that are considered normal after your cystoscopy:     * Small amount of bleeding (or spotting) that clears within the next 24 hours     * Slight burning sensation with urination     * Sensation to of needing to avoid more frequently     * The feeling of \"air\" in your urine     * Mild discomfort that is relieved with Tylenol        Please contact our office promptly if you:     * Develop a fever above 101 degrees     * Are unable to urinate     * Develop bright red blood that does not stop     * Severe pain or swelling         Please contact " our office with any concerns or questions @[facility].

## 2025-02-20 NOTE — NURSING NOTE
Chief Complaint   Patient presents with    hx blader cancer     Here in clinic for a cystoscopy    Prior to the start of the procedure DR MUNOZ and with procedural staff participation, I verbally confirmed the patient s identity using two indicators, relevant allergies, that the procedure was appropriate and matched the consent or emergent situation, and that the correct equipment/implants were available. Immediately prior to starting the procedure I conducted the Time Out with the procedural staff and re-confirmed the patient s name, procedure, and site/side. I have wiped the patient off with the povidone-Iodine solution, draped them,  used Lidocaine hydrochloride jelly, and instilled sterile water into the bladder. (The Joint Commission universal protocol was followed.)  Yes    5mL 2% lidocaine hydrochloride Urojet instilled into urethra.    NDC# [address]-7  Lot #: [id number]  Expiration Date:  10-26  Chapincito Jordan CMA

## 2025-02-20 NOTE — PROGRESS NOTES
"  CHIEF COMPLAINT   It was my pleasure to see Jared Rae who is a 59 year old male for follow-up of bladder cancer.      HPI  Jared Rae is a very pleasant 59 year old male who presents with a history of bladder cancer. Had TURBT and right ureteroscopy 1/10/2024. There were no suspicious lesions noted on direct visualization of right ureter on ureteroscopy. Bladder tumor resected and LG Ta disease.      Bladder Cancer History  1/10/2024 - TURBT - LG Ta  8/1/2024 - cysto neg; cytol neg  2/20/2025 - cysto neg; cytol pending     TURBT 1/10/2024  Final Diagnosis   A. Bladder Tumor:  - Low-grade papillary urothelial carcinoma, noninvasive  - No muscularis propria seen      CT Urogram 11/17/2023  IMPRESSION:  1.  Papillary filling defect involving the left posterior bladder lateral to the UVJ suspicious for possible urothelial neoplasm. Additional indeterminate anti-dependent excretory phase filling defect in the mid right ureter.     PHYSICAL EXAM  Patient is a 59 year old  male   Vitals: Blood pressure 136/88, pulse 82, height 1.93 m (6' 4\"), weight 106.6 kg (235 lb), SpO2 98%.  General Appearance Adult: Body mass index is 28.61 kg/m .  Alert, no acute distress, oriented  Lungs: no respiratory distress, or pursed lip breathing  Abdomen: soft, nontender, no organomegaly or masses  Back: no CVAT  Neuro: Alert, oriented, speech and mentation normal  Psych: affect and mood normal  : penis, scrotum, testes normal    OFFICE CYSTOSCOPY 2/20/2024     Pre-procedure diagnosis:       Bladder Cancer  Post-procedure diagnosis:      Normal Cystoscopy  Procedure performed:             Cystourethroscopy  Surgeon:                                 Abdelrahman Oswald MD  Anesthesia:                             Local     Description of procedure:   After fully informed, voluntary consent was obtained, the patient was brought into the procedure room, identified and placed in a supine position on the cystoscopy table.  The " groin/scrotum were prepped with betadine and draped in a sterile fashion.  Urojet lidocaine gel was introduced.  A 15F flexible cystoscope was inserted into the urethra, and the bladder and urethra were examined in a systematic manner.  The patient tolerated the procedure well and there were no complications.       Cystoscopic findings:  The urethra was normal without strictures.  The prostate was 4cm long and demonstrated moderate bilobar hypertrophy.  There was a large median lobe.  The external sphincter coapted well and the bladder neck was open. The bladder was completely surveyed.  There was mild trabeculation.  There were no neoplasms, stones, or diverticula identifed.  The ureteric orifices were normal in position and number and effluxing clear urine. Prior resection site healthy with no suspicious findings today.     ASSESSMENT and PLAN  59 year old male with LG Ta urothelial carcinoma of the bladder, TURBT 1/10/2024. No lesion noted on ureteroscopy of right ureter despite indeterminate lesion on CT. No recurrence noted today.     Plan:  Cytology today  Follow-up 12 months with office cystoscopy    Abdelrahman Oswald MD  Urology  [facility]

## 2025-03-22 ENCOUNTER — HEALTH MAINTENANCE LETTER (OUTPATIENT)
Age: 60
End: 2025-03-22

## 2025-03-31 ENCOUNTER — OFFICE VISIT (OUTPATIENT)
Dept: ORTHOPEDICS | Facility: CLINIC | Age: 60
End: 2025-03-31
Payer: COMMERCIAL

## 2025-03-31 ENCOUNTER — ANCILLARY PROCEDURE (OUTPATIENT)
Dept: GENERAL RADIOLOGY | Facility: CLINIC | Age: 60
End: 2025-03-31
Attending: FAMILY MEDICINE
Payer: COMMERCIAL

## 2025-03-31 DIAGNOSIS — M25.562 LEFT MEDIAL KNEE PAIN: Primary | ICD-10-CM

## 2025-03-31 DIAGNOSIS — M25.562 LEFT KNEE PAIN, UNSPECIFIED CHRONICITY: ICD-10-CM

## 2025-03-31 DIAGNOSIS — M25.562 LEFT KNEE PAIN, UNSPECIFIED CHRONICITY: Primary | ICD-10-CM

## 2025-03-31 PROCEDURE — 73562 X-RAY EXAM OF KNEE 3: CPT | Mod: LT | Performed by: RADIOLOGY

## 2025-03-31 PROCEDURE — 99203 OFFICE O/P NEW LOW 30 MIN: CPT | Performed by: FAMILY MEDICINE

## 2025-03-31 NOTE — LETTER
3/31/2025      RE: Jared Rae  [address] [place] [id number]     Dear Colleague,    Thank you for referring your patient, Jared Rae, to the [facility]. Please see a copy of my visit note below.      [facility]  SPORTS & ORTHOPEDIC CLINIC VISIT     Mar 31, 2025        ASSESSMENT & PLAN    59-year-old with 2 months of medial left knee pain that is likely due to degenerative meniscus injury.  Possibly related to chondrocalcinosis seen on radiographs.    Reviewed imaging and assessment with patient in detail  -Try using topical diclofenac (voltaren gel) on the knee 2-3 times daily for the next week. May also try oral ibuprofen or naproxen instead  - you may try using a knee sleeve or brace with a cut out or hole for your kneecap  -perform the home exercises 3-4 times weekly. Skip any exercises that cause pain. Contact our office if you would like a referral to physical therapy  -follow up after one month if you would like to try a steroid injection into your knee    Maciel Carpenter MD  [facility]    -----  Chief Complaint   Patient presents with     Left Knee - Pain       SUBJECTIVE  Jared Rae is a/an 59 year old male who is seen as a self referral for evaluation of  Left knee pain.     The patient is seen by themselves.  The patient is Right handed    Onset: 2 month(s) ago. Reports insidious onset without acute precipitating event.  Location of Pain: left medial joint line  Worsened by: Walking  Better with: ice, rest  Treatments tried: ice, rest  Associated symptoms: None    Orthopedic/Surgical history: YES - Date: 35 years ago ACL reconstruction and menisectomy - patellar tendon graft        REVIEW OF SYSTEMS:  Do you have fever, chills, weight loss? No  Do you have any vision problems? No  Do you have any chest pain or edema? No  Do you have any shortness of breath or  wheezing?  No  Do you have stomach problems? No  Do you have any numbness or focal weakness? No  Do you have diabetes? Yes, Type 2  Do you have problems with bleeding or clotting? No  Do you have an rashes or other skin lesions? No    OBJECTIVE:  There were no vitals taken for this visit.     Patient is alert, No acute distress, pleasant and conversational.    Gait: nonantalgic. Normal heel toe gait.      left knee:   Skin intact. No erythema or ecchymosis.  No effusion or soft tissue swelling.    AROM: Zero to approximately 135  without restriction or reported pain. Creptius noted in anterior knee    Palpation: No medial or lateral facet joint tenderness.  Ttp along the medial joint line. No ttp lateral joint line    Special Tests:  Negative bounce test, negative forced flexion and + Rylan's.  No ligamentous laxity or pain with valgus or varus stress.  Negative Lachman's, Anterior Drawer and Posterior Drawer     Full Isometric quad strength, extensor mechanism in place     Neurovascularly intact in the lower extremity    Hip and Ankle with full AROM and nontender      RADIOLOGY:    3 view xrays of left knee performed and reviewed independently demonstrating changes consistent with ACL reconstruction. chondrocalcinosis. Minimal degenerative change. See EMR for formal radiology report.                Again, thank you for allowing me to participate in the care of your patient.      Sincerely,    Maciel Carpenter MD

## 2025-03-31 NOTE — PATIENT INSTRUCTIONS
-Try using topical diclofenac (voltaren gel) on the knee 2-3 times daily for the next week. May also try oral ibuprofen or naproxen instead  - you may try using a knee sleeve or brace with a cut out or hole for your kneecap  -perform the home exercises 3-4 times weekly. Skip any exercises that cause pain. Contact our office if you would like a referral to physical therapy  -follow up after one month if you would like to try a steroid injection into your knee    KEY FACTS ABOUT MENISCAL TEARS  Click to enlarge A meniscal tear can be caused by a sudden activity that twists or tears a ligament, such as a forced twisting motion of your knee, or a fall or impact during football, basketball, or soccer.    Straightening your knee further than it can normally be straightened (hyperextension) or trouble bending, like when you land from a jump, is also another cause of a meniscal tear.  A loud, painful pop at the time of the injury is very common. It causes an immediate swelling and pain around the knee as if loose or unstable.    WHAT IS MENISCAL TEAR?  A meniscal tear is tear to the meniscal ligament that keeps the knee from bending inward. The meniscal, along with other ligaments, keeps your knee and leg bones in place when you walk or run. When a ligament is injured, it can be stretched, partially torn, or completely torn. Complete tears make the knee joint very loose and unstable.    A ligament injury is also called a sprain.    HOW IS MENISCAL TEAR DIAGNOSED AND TREATED?  A physician will examine the knee, ligaments, and tissue to make an initial assessment. Typically, he or she will recommend a combination of X-rays, CT scans, or MRIs to determine the exact extent of the damage.     You will need to change or stop doing the activities that cause pain until the ligament has healed.    If you have swelling in your joint, your healthcare team may need to remove fluid from your knee with a needle and syringe.  Your care  team may wrap an elastic bandage around your knee to keep the swelling from getting worse. You may need to keep your knee in a knee immobilizer and use crutches to protect your knee while you heal.  For complete tears, you and your healthcare team will decide if you should have intense rehabilitation therapy or if you should have surgery followed by rehab. A torn meniscal cannot be sewn back together. The ligament must be surgically reconstructed by taking ligaments or tendons from another part of your leg and connecting them to the thighbone and shinbone.  If you have a completely torn mensical and it is not repaired with surgery, the effects will be life-long. Your knee may feel loose and feel like it will give way when you are running and making quick turns. Rehabilitation exercises and a special brace will help improve these symptoms. If you can do your normal activities without pain and are willing to give up activities that put extra stress on your knee, you may not need surgery.    You may consider having reconstructive meniscal tear surgery if:  Your knee is unstable and gives out during routine or athletic activity.  You are a  and your knee could be unstable and give out during your sport (for example, basketball, football, or soccer).  You are not willing to give up sports that involve pivoting and cutting, like soccer and basketball.  You want to prevent further injury to your knee. An unstable knee may lead to more injuries and arthritis.    HOW CAN I MANAGE MENISCAL TEAR?  Follow your healthcare team's instructions, including any recommended physical therapy or exercises.  To keep swelling down and help relieve pain for the first few days after the injury:  Put an ice pack, gel pack, or package of frozen vegetables wrapped in a cloth on the injured area every 3 to 4 hours for up to 20 minutes at a time.  Keep your knee up on a pillow when you sit or lie down.  Take nonprescription  pain medicine, such as acetaminophen, ibuprofen, or naproxen. Read the label and take as directed. Unless recommended by your healthcare team, you should not take this medicine for more than 10 days.    Knee Exercises (Meniscal tear)    You may do the first 7 exercises right away. You may do the rest of the exercises when the pain in your knee has decreased.    Passive knee extension: Do this exercise if you are unable to extend your knee fully. While lying on your back, place a rolled-up towel under the heel of your injured leg so the heel is about 6 inches off the ground. Relax your leg muscles and let gravity slowly straighten your knee. Try to hold this position for 2 minutes. Repeat 3 times. You may feel some discomfort while doing this exercise. Do the exercise several times a day.  This exercise can also be done while sitting in a chair with your heel on another chair or stool.    Heel slide: Sit on a firm surface with your legs straight in front of you. Slowly slide the heel of the foot on your injured side toward your buttock by pulling your knee toward your chest as you slide the heel. Return to the starting position. Do 2 sets of 15.  Standing calf stretch: Stand facing a wall with your hands on the wall at about eye level. Keep your injured leg back with your heel on the floor. Keep the other leg forward with the knee bent. Turn your back foot slightly inward (as if you were pigeon-toed). Slowly lean into the wall until you feel a stretch in the back of your calf. Hold the stretch for 15 to 30 seconds. Return to the starting position. Repeat 3 times. Do this exercise several times each day.    Hamstring stretch on wall: Lie on your back with your buttocks close to a doorway. Stretch your uninjured leg straight out in front of you on the floor through the doorway. Raise your injured leg and rest it against the wall next to the door frame. Keep your leg as straight as possible. You should feel a stretch in  the back of your thigh. Hold this position for 15 to 30 seconds. Repeat 3 times.  Straight leg raise: Lie on your back with your legs straight out in front of you. Bend the knee on your uninjured side and place the foot flat on the floor. Tighten the thigh muscle on your injured side and lift your leg about 8 inches off the floor. Keep your leg straight and your thigh muscle tight. Slowly lower your leg back down to the floor. Do 2 sets of 15.    Prone hip extension: Lie on your stomach with your legs straight out behind you. Fold your arms under your head and rest your head on your arms. Draw your belly button in towards your spine and tighten your abdominal muscles. Tighten the buttocks and thigh muscles of the leg on your injured side and lift the leg off the floor about 8 inches. Keep your leg straight. Hold for 5 seconds. Then lower your leg and relax. Do 2 sets of 15.  Clam exercise: Lie on your uninjured side with your hips and knees bent and feet together. Slowly raise your top leg toward the ceiling while keeping your heels touching each other. Hold for 2 seconds and lower slowly. Do 2 sets of 15 repetitions.    Wall squat with a ball: Stand with your back, shoulders, and head against a wall. Look straight ahead. Keep your shoulders relaxed and your feet 3 feet (90 centimeters) from the wall and shoulder's width apart. Place a soccer or basketball-sized ball behind your back. Keeping your back against the wall, slowly squat down to a 45-degree angle. Your thighs will not yet be parallel to the floor. Hold this position for 10 seconds and then slowly slide back up the wall. Repeat 10 times. Build up to 2 sets of 15.  Step-up: Stand with the foot of your injured leg on a support 3 to 5 inches (8 to 13 centimeters) high --like a small step or block of wood. Keep your other foot flat on the floor. Shift your weight onto the injured leg on the support. Straighten your injured leg as the other leg comes off the  floor. Return to the starting position by bending your injured leg and slowly lowering your uninjured leg back to the floor. Do 2 sets of 15.    Knee stabilization: Wrap a piece of elastic tubing around the ankle of your uninjured leg. Tie a knot in the other end of the tubing and close it in a door at about ankle height.  Stand facing the door on the leg without tubing (your injured leg) and bend your knee slightly, keeping your thigh muscles tight. Stay in this position while you move the leg with the tubing (the uninjured leg) straight back behind you. Do 2 sets of 15.  Turn 90 degrees so the leg without tubing is closest to the door. Move the leg with tubing away from your body. Do 2 sets of 15.  Turn 90 degrees again so your back is to the door. Move the leg with tubing straight out in front of you. Do 2 sets of 15.  Turn your body 90 degrees again so the leg with tubing is closest to the door. Move the leg with tubing across your body. Do 2 sets of 15.  Hold onto a chair if you need help balancing. This exercise can be made more challenging by standing on a firm pillow or foam mat while you move the leg with tubing.    Resisted terminal knee extension: Make a loop with a piece of elastic tubing by tying a knot in both ends. Close the knot in a door at knee height. Step into the loop with your injured leg so the tubing is around the back of your knee. Lift the other foot off the ground and hold onto a chair for balance, if needed. Bend the knee with tubing about 45 degrees. Slowly straighten your leg, keeping your thigh muscle tight as you do this. Repeat 15 times. Do 2 sets of 15. If you need an easier way to do this, stand on both legs for better support while you do the exercise.  If you have access to a wobble board, do the following exercises:            Developed by [employer]  Published by [employer]  Copyright  2014 [employer] and/or one of its subsidiaries. All rights reserved.

## 2025-03-31 NOTE — PROGRESS NOTES
[facility]  SPORTS & ORTHOPEDIC CLINIC VISIT     Mar 31, 2025        ASSESSMENT & PLAN    59-year-old with 2 months of medial left knee pain that is likely due to degenerative meniscus injury.  Possibly related to chondrocalcinosis seen on radiographs.    Reviewed imaging and assessment with patient in detail  -Try using topical diclofenac (voltaren gel) on the knee 2-3 times daily for the next week. May also try oral ibuprofen or naproxen instead  - you may try using a knee sleeve or brace with a cut out or hole for your kneecap  -perform the home exercises 3-4 times weekly. Skip any exercises that cause pain. Contact our office if you would like a referral to physical therapy  -follow up after one month if you would like to try a steroid injection into your knee    Maciel Carpenter MD  [facility] [place]    -----  Chief Complaint   Patient presents with    Left Knee - Pain       SUBJECTIVE  Jared Rae is a/an 59 year old male who is seen as a self referral for evaluation of  Left knee pain.     The patient is seen by themselves.  The patient is Right handed    Onset: 2 month(s) ago. Reports insidious onset without acute precipitating event.  Location of Pain: left medial joint line  Worsened by: Walking  Better with: ice, rest  Treatments tried: ice, rest  Associated symptoms: None    Orthopedic/Surgical history: YES - Date: 35 years ago ACL reconstruction and menisectomy - patellar tendon graft        REVIEW OF SYSTEMS:  Do you have fever, chills, weight loss? No  Do you have any vision problems? No  Do you have any chest pain or edema? No  Do you have any shortness of breath or wheezing?  No  Do you have stomach problems? No  Do you have any numbness or focal weakness? No  Do you have diabetes? Yes, Type 2  Do you have problems with bleeding or clotting? No  Do you have an rashes or other skin lesions? No    OBJECTIVE:  There were no vitals taken  for this visit.     Patient is alert, No acute distress, pleasant and conversational.    Gait: nonantalgic. Normal heel toe gait.      left knee:   Skin intact. No erythema or ecchymosis.  No effusion or soft tissue swelling.    AROM: Zero to approximately 135  without restriction or reported pain. Creptius noted in anterior knee    Palpation: No medial or lateral facet joint tenderness.  Ttp along the medial joint line. No ttp lateral joint line    Special Tests:  Negative bounce test, negative forced flexion and + Rylan's.  No ligamentous laxity or pain with valgus or varus stress.  Negative Lachman's, Anterior Drawer and Posterior Drawer     Full Isometric quad strength, extensor mechanism in place     Neurovascularly intact in the lower extremity    Hip and Ankle with full AROM and nontender      RADIOLOGY:    3 view xrays of left knee performed and reviewed independently demonstrating changes consistent with ACL reconstruction. chondrocalcinosis. Minimal degenerative change. See EMR for formal radiology report.

## 2025-04-24 ENCOUNTER — OFFICE VISIT (OUTPATIENT)
Dept: ORTHOPEDICS | Facility: CLINIC | Age: 60
End: 2025-04-24
Payer: COMMERCIAL

## 2025-04-24 DIAGNOSIS — G89.29 CHRONIC PAIN OF LEFT KNEE: Primary | ICD-10-CM

## 2025-04-24 DIAGNOSIS — M25.562 CHRONIC PAIN OF LEFT KNEE: Primary | ICD-10-CM

## 2025-04-24 RX ORDER — TRIAMCINOLONE ACETONIDE 40 MG/ML
40 INJECTION, SUSPENSION INTRA-ARTICULAR; INTRAMUSCULAR
Status: COMPLETED | OUTPATIENT
Start: 2025-04-24 | End: 2025-04-24

## 2025-04-24 RX ORDER — LIDOCAINE HYDROCHLORIDE 10 MG/ML
7 INJECTION, SOLUTION EPIDURAL; INFILTRATION; INTRACAUDAL; PERINEURAL
Status: COMPLETED | OUTPATIENT
Start: 2025-04-24 | End: 2025-04-24

## 2025-04-24 RX ADMIN — TRIAMCINOLONE ACETONIDE 40 MG: 40 INJECTION, SUSPENSION INTRA-ARTICULAR; INTRAMUSCULAR at 11:15

## 2025-04-24 RX ADMIN — LIDOCAINE HYDROCHLORIDE 7 ML: 10 INJECTION, SOLUTION EPIDURAL; INFILTRATION; INTRACAUDAL; PERINEURAL at 11:15

## 2025-04-24 NOTE — LETTER
4/24/2025      Jared Rae  [address] [place] [id number]      Dear Colleague,    Thank you for referring your patient, Jared Rae, to the [facility] [place]. Please see a copy of my visit note below.        [facility]  Orthopaedic Surgery  by Edwin Ro PA-C    Jared Rae MRN# [id number]    YOB: 1965               Clinical History:   59 year old male with history of chronic left knee pain secondary to degenerative meniscus injury versus chondrocalcinosis who presents today for a left knee steroid injection per referral from Dr. Carpenter.          Procedure:       Large Joint Injection/Arthocentesis: L knee joint    Date/Time: 4/24/2025 11:15 AM    Performed by: Edwin Ro PA-C  Authorized by: Edwin Ro PA-C    Needle Size:  21 G  Guidance: landmark guided    Approach:  Anterolateral  Location:  Knee      Medications:  40 mg triamcinolone 40 MG/ML; 7 mL lidocaine (PF) 1 %  Outcome:  Tolerated well, no immediate complications  Procedure discussed: discussed risks, benefits, and alternatives    Consent Given by:  Patient  Timeout: timeout called immediately prior to procedure    Prep: patient was prepped and draped in usual sterile fashion              Assessment and Plan:   Assessment:  59 year old male with history of chronic left knee pain secondary to degenerative meniscus injury versus chondrocalcinosis who presents today for a left knee steroid injection per referral from Dr. Carpenter.  Tolerated the procedure well.     Plan:  After consent was given the procedure was performed as above.  The patient tolerated it well.  A Band-Aid was replaced which the patient can remove this evening and start showering tomorrow.  I instructed them to watch for signs of infection including erythema, discharge and increased pain.  If they have any concerns I instructed them to call.  All questions were answered and the patient  was in agreement the plan.     Edwin Ro PA-C  Physician Assistant   Oncology and Adult Reconstructive Surgery  Dept Orthopaedic Surgery, [facility]      Again, thank you for allowing me to participate in the care of your patient.        Sincerely,        Edwin Ro PA-C    Electronically signed

## 2025-04-24 NOTE — NURSING NOTE
[facility] CLINIC [address] [address]  [phone]  Dept: [phone]  ______________________________________________________________________________    Patient: Jared Rae   : 1965   MRN: [id number]   2025    INVASIVE PROCEDURE SAFETY CHECKLIST    Date: 2025   Procedure:Left knee steroid injection  Patient Name: Jared Rae  MRN: [id number]  YOB: 1965    Action: Complete sections as appropriate. Any discrepancy results in a HARD COPY until resolved.     PRE PROCEDURE:  Patient ID verified with 2 identifiers (name and  or MRN): Yes  Procedure and site verified with patient/designee (when able): Yes  Accurate consent documentation in medical record: Yes  H&P (or appropriate assessment) documented in medical record: NA  H&P must be up to 20 days prior to procedure and updates within 24 hours of procedure as applicable: NA  Relevant diagnostic and radiology test results appropriately labeled and displayed as applicable: Yes  Procedure site(s) marked with provider initials: NA    TIMEOUT:  Time-Out performed immediately prior to starting procedure, including verbal and active participation of all team members addressing the following:Yes  * Correct patient identify  * Confirmed that the correct side and site are marked  * An accurate procedure consent form  * Agreement on the procedure to be done  * Correct patient position  * Relevant images and results are properly labeled and appropriately displayed  * The need to administer antibiotics or fluids for irrigation purposes during the procedure as applicable   * Safety precautions based on patient history or medication use    DURING PROCEDURE: Verification of correct person, site, and procedures any time the responsibility for care of the patient is transferred to another member of the care team.       The following medications were given:          Prior to injection, verified patient identity using patient's name and date of birth.  Due to injection administration, patient instructed to remain in clinic for 15 minutes  afterwards, and to report any adverse reaction to me immediately.    Joint injection was performed.    Medication Name: Lidocaine NDC 46381-549-41  Drug Amount Wasted:  Yes: 13 mg/ml   Vial/Syringe: Single dose vial  Expiration Date:  04/2028    Medication Name Kenolog NDC [address]-6    Scribed by Asha Montilla ATC for Edwin Ro PA-C on April 24, 2025 at 11:18a based on the provider's statements to me.     Asha Montilla, ATC

## 2025-04-24 NOTE — PROGRESS NOTES
[facility]  Orthopaedic Surgery  by Edwin Ro PA-C    Jared Rae MRN# [id number]    YOB: 1965               Clinical History:   59 year old male with history of chronic left knee pain secondary to degenerative meniscus injury versus chondrocalcinosis who presents today for a left knee steroid injection per referral from Dr. Carpenter.          Procedure:       Large Joint Injection/Arthocentesis: L knee joint    Date/Time: 4/24/2025 11:15 AM    Performed by: Edwin Ro PA-C  Authorized by: Edwin Ro PA-C    Needle Size:  21 G  Guidance: landmark guided    Approach:  Anterolateral  Location:  Knee      Medications:  40 mg triamcinolone 40 MG/ML; 7 mL lidocaine (PF) 1 %  Outcome:  Tolerated well, no immediate complications  Procedure discussed: discussed risks, benefits, and alternatives    Consent Given by:  Patient  Timeout: timeout called immediately prior to procedure    Prep: patient was prepped and draped in usual sterile fashion              Assessment and Plan:   Assessment:  59 year old male with history of chronic left knee pain secondary to degenerative meniscus injury versus chondrocalcinosis who presents today for a left knee steroid injection per referral from Dr. Carpenter.  Tolerated the procedure well.     Plan:  After consent was given the procedure was performed as above.  The patient tolerated it well.  A Band-Aid was replaced which the patient can remove this evening and start showering tomorrow.  I instructed them to watch for signs of infection including erythema, discharge and increased pain.  If they have any concerns I instructed them to call.  All questions were answered and the patient was in agreement the plan.     Edwin Ro PA-C  Physician Assistant   Oncology and Adult Reconstructive Surgery  Dept Orthopaedic Surgery, [facility]

## 2025-05-29 DIAGNOSIS — M25.562 CHRONIC PAIN OF LEFT KNEE: Primary | ICD-10-CM

## 2025-05-29 DIAGNOSIS — G89.29 CHRONIC PAIN OF LEFT KNEE: Primary | ICD-10-CM

## 2025-06-03 ENCOUNTER — TRANSFERRED RECORDS (OUTPATIENT)
Dept: ADMINISTRATIVE | Facility: CLINIC | Age: 60
End: 2025-06-03
Payer: COMMERCIAL

## 2025-06-03 NOTE — PROCEDURES
[facility]   [address] [place] [address], [address], [place] [address]     Patient Name: Jared Rae  Gender:   Male  Exam Date: 06/03/2025 Visit Number:   [id number]  Age: 59 Years 8 Months YOB: 1965  Attending MD: Farhad Lloyd MD Medical Record#:   [id number]  -----------------------------------------------------------------------------------------------------------------------------   Procedure: Colonoscopy   Indications: Colorectal cancer screening  Referring MD: Referral Self  Primary MD:      Jacoby Jaquez MD   Medications: Admitting Medications:   0.9% Normal Saline at [facility]   Intra Procedure Medications:   Patient received monitored anesthesia care.     Complications: No immediate complications  ______________________________________________________________________________  Procedure:   An examination of the heart and lungs was performed and found to be within acceptable limits.  The patient was therefore deemed a reasonable candidate for endoscopy and monitored anesthesia care.   The risks, benefits and plan of the procedure were discussed with the patient and/or patient representative and all questions were answered.  After obtaining informed consent, the patient received monitored anesthesia care and I passed the scope   without difficulty via the rectum to the cecum.  The appendiceal orifice and ic valve were identified.  The scope was retroflexed during the examination  The quality of the prep was good  (Miralax/Gatorade/2 tablets Bisacodyl/Magnesium Citrate).    This was a complete examination throughout the entire colon.    Findings:    Anal canal:  normal    The entire colon was normal.    Impression:  Screening Colonoscopy     Plan  Repeat colonoscopy in 10 years for colon cancer screening.  If you have signs or symptoms of lower GI illness or a new diagnosis of colon cancer in an immediate family member, you should contact your GI provider or your  primary provider to  discuss whether your next exam should be repeated sooner.    We will attempt to contact you at appropriate intervals via [place] mail.  We may not be able to find you or contact you at that time, therefore you should know that the responsibility for following our recommendation rests with you.  If you don't hear from us at the time your procedure is due, please contact our office to schedule an appointment.  If your contact information should change, please contact our office so that we can update your records.        Electronically signed by:___________________  Farhad Lloyd MD                 06/03/2025    Medications:  Medication Dose Sig Description PRN Status PRN Reason Comments   allopurinol 100 mg tablet 100 mg take 1 tablet by oral route  every day N  taking as directed   Cialis 5 mg tablet 5 mg take 1 tablet by oral route  every day N  taking as directed   indomethacin 50 mg capsule 50 mg take 1 capsule by oral route  as needed day with food N  taking as directed   losartan 50 mg tablet 50 mg take 1 tablet by oral route  every day N  taking as directed   rosuvastatin 20 mg tablet 20 mg take 1 tablet by oral route  every day N  taking as directed   Rybelsus 14 mg tablet 14 mg take 1 tablet by oral route  every day in the morning 30 min before any food, drink or medication with no more than 4 oz plain water N  taking as directed   Zyrtec 10 mg tablet 10 mg take 1 tablet by oral route  every week N  taking as directed     Allergies:  Medication Name Ingredient Reaction Comment    NO KNOWN ALLERGIES       Vital Signs:  Date Time Systolic Diastolic Height Weight BMI   06/03/2025 806  79 76 in 236.99 28.80     Race:  White  Ethnicity:  Not  or   Preferred Language:  English      cc: Jacoby Jaquez MD        [facility] [phone]

## 2025-06-20 ENCOUNTER — ANCILLARY PROCEDURE (OUTPATIENT)
Dept: MRI IMAGING | Facility: CLINIC | Age: 60
End: 2025-06-20
Attending: PHYSICIAN ASSISTANT
Payer: COMMERCIAL

## 2025-06-20 ENCOUNTER — RESULTS FOLLOW-UP (OUTPATIENT)
Dept: ORTHOPEDICS | Facility: CLINIC | Age: 60
End: 2025-06-20

## 2025-06-20 DIAGNOSIS — G89.29 CHRONIC PAIN OF LEFT KNEE: Primary | ICD-10-CM

## 2025-06-20 DIAGNOSIS — G89.29 CHRONIC PAIN OF LEFT KNEE: ICD-10-CM

## 2025-06-20 DIAGNOSIS — M25.562 CHRONIC PAIN OF LEFT KNEE: ICD-10-CM

## 2025-06-20 DIAGNOSIS — M25.562 CHRONIC PAIN OF LEFT KNEE: Primary | ICD-10-CM

## 2025-06-20 PROCEDURE — 73721 MRI JNT OF LWR EXTRE W/O DYE: CPT | Mod: LT | Performed by: RADIOLOGY

## 2025-06-20 NOTE — DISCHARGE INSTRUCTIONS
MRI Contrast Discharge Instructions    The IV contrast you received today will pass out of your body in your  urine. This will happen in the next 24 hours. You will not feel this process.  Your urine will not change color.    Drink at least 4 extra glasses of water or juice today (unless your doctor  has restricted your fluids). This reduces the stress on your kidneys.  You may take your regular medicines.    If you are on dialysis: It is best to have dialysis today.    If you have a reaction: Most reactions happen right away. If you have  any new symptoms after leaving the hospital (such as hives or swelling),  call your hospital at the correct number below. Or call your family doctor.  If you have breathing distress or wheezing, call 911.    Special instructions: ***    I have read and understand the above information.    Signature:______________________________________ Date:___________    Staff:__________________________________________ Date:___________     Time:__________    [place] [facility] Departments:    ___[place]: [phone]  ___[facility]: [phone]  ___[place]: [phone] ___[facility]: [phone]  ___[facility]: [phone]  ___[facility]: [phone]  ___[phone]  ___[facility]: [phone]  ___[phone]

## 2025-06-23 ENCOUNTER — OFFICE VISIT (OUTPATIENT)
Dept: ORTHOPEDICS | Facility: CLINIC | Age: 60
End: 2025-06-23
Payer: COMMERCIAL

## 2025-06-23 ENCOUNTER — TELEPHONE (OUTPATIENT)
Dept: ORTHOPEDICS | Facility: CLINIC | Age: 60
End: 2025-06-23

## 2025-06-23 ENCOUNTER — PATIENT OUTREACH (OUTPATIENT)
Dept: CARE COORDINATION | Facility: CLINIC | Age: 60
End: 2025-06-23

## 2025-06-23 VITALS — HEIGHT: 77 IN | BODY MASS INDEX: 28.81 KG/M2 | WEIGHT: 244 LBS

## 2025-06-23 DIAGNOSIS — M23.204 OLD COMPLEX TEAR OF MEDIAL MENISCUS OF LEFT KNEE: Primary | ICD-10-CM

## 2025-06-23 PROCEDURE — 99204 OFFICE O/P NEW MOD 45 MIN: CPT | Performed by: ORTHOPAEDIC SURGERY

## 2025-06-23 NOTE — LETTER
6/23/2025      Jared Rae  [address] [place] [id number]      Dear Colleague,    Thank you for referring your patient, Jared Rae, to the [facility] [place]. Please see a copy of my visit note below.    CHIEF CONCERN: Medial meniscus tear left knee    HISTORY:   Very pleasant 59-year-old male with a month-long history of left-sided knee pain.  Bothers him on a daily basis.  Cannot do the things that he wants to do.  Feels sufficiently limited.  Has done extensive physical therapy as well as corticosteroid injection management.  He is 30+ years status post ACL reconstruction bone tendon bone autograft on that side.  Radiographs have shown not significant arthritis MRI showed a tear of the medial meniscus.  Referred to my clinic for surgical consult as he is failed to see lasting improvement with nonsurgical intervention.    PAST MEDICAL HISTORY: (Reviewed with the patient and in the EPIC medical record)  Diabetes    PAST SURGICAL HISTORY: (Reviewed with the patient and in the Psychiatric medical record)  ACL reconstruction bone tendon bone autograft left knee approximately 35 years ago.  Doing well from a stability standpoint  ACL reconstruction bone tendon bone allograft approximately 15 years ago.  Doing well.    MEDICATIONS: (Reviewed with the patient and in the Psychiatric medical record)    Notable medications include: Semaglutide    ALLERGIES: (Reviewed with the patient and in the Psychiatric medical record)  None      SOCIAL HISTORY: (Reviewed with the patient and in the medical record)  --Tobacco: Non-smoker  --Occupation: Commercial real estate  --Avocation/Sport: Golf    FAMILY HISTORY: (Reviewed with the patient and in the medical record)  -- No family history of bleeding, clotting, or difficulty with anesthesia    REVIEW OF SYSTEMS: (Reviewed with the patient and on the health intake form)  -- A comprehensive 10 point review of systems was conducted and is negative  except as noted in the HPI    EXAM:     General: Awake, Alert and Oriented, No acute Distress. Articulate and Interactive    Body mass index is 28.93 kg/m .    Left lower extremity :  Skin is Warm and Well perfused, no suggestion of infection  Positive medial joint line tenderness  Stable to varus valgus stress testing.  Stable anterior posterior drawer testing  Lachman 0, no pivot shift  Range of motion 0-1 25  1+ effusion  EHL/FHL/TA/GS 5/5  Sensation intact L3-S1  2+ Dorsalis Pedis Pulse    IMAGING:    Radiographs of the left knee from 3/31/2025 were independently reviewed by me and findings were discussed with the patient today. The imaging demonstrates well-preserved joint space, ACL reconstruction tunnels and hardware in good position.  No significant arthritis..    MRI of the left knee from 6/20/2025 were independently reviewed by me and findings were discussed with the patient today. The imaging demonstrates complex and displaced tear of the medial meniscus left knee, well-preserved tibiofemoral articular cartilage, questionable cyclops lesion within the anterior intercondylar notch early chondrosis in the patellofemoral compartment particular the trochlea.    ASSESSMENT:  Symptomatic medial meniscus tear left knee  30 years status post ACL reconstruction bone tendon bone autograft with intact graft  Approximately 15 years status post ACL reconstruction right knee with bone tendon bone allograft    PLAN:  Long discussion there with the patient.  This time I think he is failed on surgery.  I discussed with him the pros cons risk and benefits of surgery versus not surgery.  Discussed expected course recovery alternative treatment options  Surgical plan will be examination under anesthesia left knee, left knee arthroscopy, partial meniscectomy.  He would like to do this in the fall at the conclusion of the golf season and after his daughter's wedding.    Again, thank you for allowing me to participate in the  care of your patient.        Sincerely,        Brett Treadwell MD    Electronically signed

## 2025-06-23 NOTE — TELEPHONE ENCOUNTER
Procedure: Meniscectomy  Facility: [facility]  Length: 60 minutes  Anesthesia: Choice  Post-op appointments needed: 2 weeks Provider/PA only, 6 weeks with provider only.  Surgery packet/instructions given to patient?  Yes   PT: [facility]  PCP: [facility] or [facility]      Teaching Flowsheet     Visit Type: In Clinic      Total Time Spent: 10 min.    Teaching Topic: Surgery teaching    Surgeon: Dr. Treadwell  Type of Anesthesia: Choice    Pertinent Medical History: No Pertinent Medical History  Were medical conditions reviewed and appropriate for location? Yes  BMI:     Person(s) involved in teaching:   Patient  : No.   Verified Patient's Phone Number: YES: see chart    Caregiver//  Name: [place]ia  Phone Number: in chart   Relationship: Wife  Consent to Communicate on file: No  Authorization to Share Protected Health Information- Person to person communication signed by patient and authorized the following person or people:      Motivation Level:  Asks Questions: Yes  Eager to Learn: Yes  Cooperative: Yes  Receptive (willing/able to accept information): Yes  Any cultural factors/Rastafari beliefs that may influence understanding or compliance? No     Patient demonstrates understanding of the following:  Reason for the appointment, diagnosis and treatment plan: Yes  Knowledge of proper use of medications and conditions for which they are ordered (with special attention to potential side effects or drug interactions): Yes  Which situations necessitate calling provider and whom to contact: Yes     Teaching Concerns Addressed:   Proper use and care of medical equip, care aids, etc.: Yes  Nutritional needs and diet plan: Yes  Pain management techniques: NA  Wound Care: Yes  How and/when to access community resources: Yes  Need for pre-op with in 30 days: YES, will be done with PCP. I asked them to ensure they go over their daily medications during this visit and discuss what medications need to be stopped before surgery and  when. If you are doing a pre-op with your PCP and they are not within the [facility], I ask them to fax it to our pre-op office. Patient verbalized understanding.       Does patient have difficulty getting a ride to appointments (post-ops, PT/OT): No  Patient's plan after discharge: home with family or spouse     Instructional Materials Used/Given:  two bottles of chlorhexidine soap and a surgery packet given to patient in clinic. Instructed patient to buy or get two 8 ounces bottles of antiseptic surgical soap called 4% CHG. Common name brand of this soap are Hibiclens and Exidine. I told them they can find this at their local pharmacy, clinic or retail store. If they have trouble finding it, I told them to ask their pharmacist to help them find a substitute.   - Important Contact Info/Phone Numbers: emphasizing clinic number [phone] and after hours number [phone]  - Map/location of surgery and follow-up appointments  - Showering instructions  - Stoplight Tool     -Next step: schedule a surgery date.    Torie Bonilla RN

## 2025-06-23 NOTE — TELEPHONE ENCOUNTER
Patient scheduled for surgery with Dr Treadwell    Spoke with: Teresa (daughter)    Reason for Surgical Date: Patient Preference : Requested specific date     Date of Surgery: 11/7/25    Estimated Arrival time Discussed with Patient:  No    Location of surgery: [facility]     Pre-Op H&P: Patient will schedule with PCP    Imaging: No     Additional Pre-Op Appointments: No     Post-Op Appt Date w/ NP/PA:  Shannon Estevez PA-C  11/18/25 at 2:20    Post-Op Appt Date w/ Surgeon  12/22/25 at 8:30am     Additional Post-Op Appointments: No     Discussed with patient pre-op RN will call 2-3 days prior to surgery with arrival time and instructions:  Yes     Standard Surgery Packet Sent: Yes 06/23/25  via Received in clinic by RN       Additional Comments: N/A      Aicha Otero MA on 6/23/2025 at 9:08 AM

## 2025-06-23 NOTE — NURSING NOTE
"Reason For Visit:   Chief Complaint   Patient presents with    Consult     Left knee -          ?  No  Occupation Commercial real estate.  Currently working? Yes.  Work status?  Full time.  Date of injury: N/A  Type of injury: N/A.  Date of surgery: 1990  Type of surgery: Bilateral ACL.  Smoker: No  Request smoking cessation information: No    SANE Score  Left Knee: 65  Right Knee: 90    Pain Assessment  Patient Currently in Pain: Yes  Primary Pain Location: Knee (Left knee - pain level is a 3)    Ht 1.956 m (6' 5\")   Wt 110.7 kg (244 lb)   BMI 28.93 kg/m         No Known Allergies    Current Outpatient Medications   Medication Sig Dispense Refill    allopurinol (ZYLOPRIM) 100 MG tablet TAKE 1 TABLET(100 MG) BY MOUTH DAILY 90 tablet 4    aspirin 81 MG EC tablet [ASPIRIN 81 MG EC TABLET] Take 1 tablet (81 mg total) by mouth daily. (Patient taking differently: Take 81 mg by mouth every morning.) 90 tablet 3    cetirizine (ZYRTEC) 10 MG tablet Take 10 mg by mouth every 48 hours      indomethacin (INDOCIN) 50 MG capsule Take 1 capsule (50 mg) by mouth 2 times daily as needed for moderate pain. 60 capsule 1    losartan (COZAAR) 50 MG tablet Take 1 tablet (50 mg) by mouth daily. 90 tablet 4    multivit-min/ferrous fumarate (MULTI VITAMIN ORAL) Take 1 tablet by mouth every morning      omega-3/dha/epa/fish oil (FISH OIL-OMEGA-3 FATTY ACIDS) 300-1,000 mg capsule Take 2 g by mouth every morning      rosuvastatin (CRESTOR) 20 MG tablet Take 1 tablet (20 mg) by mouth daily. 90 tablet 4    Semaglutide (RYBELSUS) 14 MG tablet Take 14 mg by mouth daily. 90 tablet 3    tadalafil (CIALIS) 5 MG tablet Take 1 tablet (5 mg) by mouth daily. 90 tablet 4     No current facility-administered medications for this visit.         Manuel Grant CMA    "

## 2025-06-23 NOTE — PROGRESS NOTES
CHIEF CONCERN: Medial meniscus tear left knee    HISTORY:   Very pleasant 59-year-old male with a month-long history of left-sided knee pain.  Bothers him on a daily basis.  Cannot do the things that he wants to do.  Feels sufficiently limited.  Has done extensive physical therapy as well as corticosteroid injection management.  He is 30+ years status post ACL reconstruction bone tendon bone autograft on that side.  Radiographs have shown not significant arthritis MRI showed a tear of the medial meniscus.  Referred to my clinic for surgical consult as he is failed to see lasting improvement with nonsurgical intervention.    PAST MEDICAL HISTORY: (Reviewed with the patient and in the EPIC medical record)  Diabetes    PAST SURGICAL HISTORY: (Reviewed with the patient and in the EPIC medical record)  ACL reconstruction bone tendon bone autograft left knee approximately 35 years ago.  Doing well from a stability standpoint  ACL reconstruction bone tendon bone allograft approximately 15 years ago.  Doing well.    MEDICATIONS: (Reviewed with the patient and in the EPIC medical record)    Notable medications include: Semaglutide    ALLERGIES: (Reviewed with the patient and in the EPIC medical record)  None      SOCIAL HISTORY: (Reviewed with the patient and in the medical record)  --Tobacco: Non-smoker  --Occupation: Commercial real estate  --Avocation/Sport: Golf    FAMILY HISTORY: (Reviewed with the patient and in the medical record)  -- No family history of bleeding, clotting, or difficulty with anesthesia    REVIEW OF SYSTEMS: (Reviewed with the patient and on the health intake form)  -- A comprehensive 10 point review of systems was conducted and is negative except as noted in the HPI    EXAM:     General: Awake, Alert and Oriented, No acute Distress. Articulate and Interactive    Body mass index is 28.93 kg/m .    Left lower extremity :  Skin is Warm and Well perfused, no suggestion of infection  Positive medial  joint line tenderness  Stable to varus valgus stress testing.  Stable anterior posterior drawer testing  Lachman 0, no pivot shift  Range of motion 0-1 25  1+ effusion  EHL/FHL/TA/GS 5/5  Sensation intact L3-S1  2+ Dorsalis Pedis Pulse    IMAGING:    Radiographs of the left knee from 3/31/2025 were independently reviewed by me and findings were discussed with the patient today. The imaging demonstrates well-preserved joint space, ACL reconstruction tunnels and hardware in good position.  No significant arthritis..    MRI of the left knee from 6/20/2025 were independently reviewed by me and findings were discussed with the patient today. The imaging demonstrates complex and displaced tear of the medial meniscus left knee, well-preserved tibiofemoral articular cartilage, questionable cyclops lesion within the anterior intercondylar notch early chondrosis in the patellofemoral compartment particular the trochlea.    ASSESSMENT:  Symptomatic medial meniscus tear left knee  30 years status post ACL reconstruction bone tendon bone autograft with intact graft  Approximately 15 years status post ACL reconstruction right knee with bone tendon bone allograft    PLAN:  Long discussion there with the patient.  This time I think he is failed on surgery.  I discussed with him the pros cons risk and benefits of surgery versus not surgery.  Discussed expected course recovery alternative treatment options  Surgical plan will be examination under anesthesia left knee, left knee arthroscopy, partial meniscectomy.  He would like to do this in the fall at the conclusion of the golf season and after his daughter's wedding.

## 2025-06-25 ENCOUNTER — PATIENT OUTREACH (OUTPATIENT)
Dept: CARE COORDINATION | Facility: CLINIC | Age: 60
End: 2025-06-25
Payer: COMMERCIAL

## 2025-07-05 ENCOUNTER — HEALTH MAINTENANCE LETTER (OUTPATIENT)
Age: 60
End: 2025-07-05

## 2025-07-22 DIAGNOSIS — M1A.09X0 IDIOPATHIC CHRONIC GOUT OF MULTIPLE SITES WITHOUT TOPHUS: ICD-10-CM

## 2025-07-23 RX ORDER — ALLOPURINOL 100 MG/1
100 TABLET ORAL DAILY
Qty: 90 TABLET | Refills: 4 | Status: SHIPPED | OUTPATIENT
Start: 2025-07-23

## 2025-09-01 ENCOUNTER — PATIENT OUTREACH (OUTPATIENT)
Dept: CARE COORDINATION | Facility: CLINIC | Age: 60
End: 2025-09-01
Payer: COMMERCIAL

## (undated) DEVICE — SOL WATER IRRIG 3000ML BAG 2B0307

## (undated) DEVICE — GUIDEWIRE AMPLATZ 0.035"X145CM  SUPER STIFF 640-104

## (undated) DEVICE — SPECIMEN CONTAINER W/10% BUFFERED FORMALIN 120ML 591201

## (undated) DEVICE — SOL NACL 0.9% IRRIG 3000ML BAG 2B7477

## (undated) DEVICE — PACK CYSTO CUSTOM ASC

## (undated) DEVICE — SOL NACL 0.9% IRRIG 500ML BOTTLE 2F7123

## (undated) DEVICE — SUCTION MANIFOLD NEPTUNE 2 SYS 1 PORT 702-025-000

## (undated) DEVICE — ESU GROUND PAD ADULT W/CORD E7507

## (undated) DEVICE — PREP POVIDONE-IODINE 7.5% SCRUB 4OZ BOTTLE MDS093945

## (undated) DEVICE — PAD CHUX UNDERPAD 30X36" P3036C

## (undated) DEVICE — GLOVE BIOGEL PI MICRO SZ 7.5 48575

## (undated) DEVICE — GUIDEWIRE SENSOR DUAL FLEX STR 0.035"X150CM M0066703080

## (undated) DEVICE — LINEN TOWEL PACK X5 5464

## (undated) DEVICE — ENDO SEAL BX PORT BPS-A

## (undated) DEVICE — DRAPE C-ARM W/STRAPS 42X72" 07-CA104

## (undated) DEVICE — PREP POVIDONE IODINE SOLUTION 10% 4OZ BOTTLE 29906-004

## (undated) DEVICE — DRSG TELFA 3X8" 1238

## (undated) DEVICE — RAD RX CONRAY 60% (50ML) 095305 CHARGE PER ML

## (undated) RX ORDER — PROPOFOL 10 MG/ML
INJECTION, EMULSION INTRAVENOUS
Status: DISPENSED
Start: 2024-01-10

## (undated) RX ORDER — TRIAMCINOLONE ACETONIDE 40 MG/ML
INJECTION, SUSPENSION INTRA-ARTICULAR; INTRAMUSCULAR
Status: DISPENSED
Start: 2025-04-24

## (undated) RX ORDER — DEXAMETHASONE SODIUM PHOSPHATE 4 MG/ML
INJECTION, SOLUTION INTRA-ARTICULAR; INTRALESIONAL; INTRAMUSCULAR; INTRAVENOUS; SOFT TISSUE
Status: DISPENSED
Start: 2024-01-10

## (undated) RX ORDER — CEFAZOLIN SODIUM 2 G/50ML
SOLUTION INTRAVENOUS
Status: DISPENSED
Start: 2024-01-10

## (undated) RX ORDER — LIDOCAINE HYDROCHLORIDE 20 MG/ML
JELLY TOPICAL
Status: DISPENSED
Start: 2024-01-10

## (undated) RX ORDER — ONDANSETRON 2 MG/ML
INJECTION INTRAMUSCULAR; INTRAVENOUS
Status: DISPENSED
Start: 2024-01-10

## (undated) RX ORDER — FENTANYL CITRATE 50 UG/ML
INJECTION, SOLUTION INTRAMUSCULAR; INTRAVENOUS
Status: DISPENSED
Start: 2024-01-10

## (undated) RX ORDER — LIDOCAINE HYDROCHLORIDE 10 MG/ML
INJECTION, SOLUTION INFILTRATION; PERINEURAL
Status: DISPENSED
Start: 2025-04-24

## (undated) RX ORDER — ACETAMINOPHEN 325 MG/1
TABLET ORAL
Status: DISPENSED
Start: 2024-01-10